# Patient Record
Sex: MALE | Race: WHITE | NOT HISPANIC OR LATINO | URBAN - METROPOLITAN AREA
[De-identification: names, ages, dates, MRNs, and addresses within clinical notes are randomized per-mention and may not be internally consistent; named-entity substitution may affect disease eponyms.]

---

## 2018-01-11 ENCOUNTER — INPATIENT (INPATIENT)
Facility: HOSPITAL | Age: 58
LOS: 4 days | Discharge: HOME | End: 2018-01-16
Attending: INTERNAL MEDICINE

## 2018-01-11 DIAGNOSIS — I24.9 ACUTE ISCHEMIC HEART DISEASE, UNSPECIFIED: ICD-10-CM

## 2018-01-19 DIAGNOSIS — I47.2 VENTRICULAR TACHYCARDIA: ICD-10-CM

## 2018-01-19 DIAGNOSIS — R07.9 CHEST PAIN, UNSPECIFIED: ICD-10-CM

## 2018-01-19 DIAGNOSIS — Z87.891 PERSONAL HISTORY OF NICOTINE DEPENDENCE: ICD-10-CM

## 2018-01-19 DIAGNOSIS — E11.8 TYPE 2 DIABETES MELLITUS WITH UNSPECIFIED COMPLICATIONS: ICD-10-CM

## 2018-01-19 DIAGNOSIS — I21.4 NON-ST ELEVATION (NSTEMI) MYOCARDIAL INFARCTION: ICD-10-CM

## 2018-01-19 DIAGNOSIS — I10 ESSENTIAL (PRIMARY) HYPERTENSION: ICD-10-CM

## 2018-01-19 DIAGNOSIS — E78.5 HYPERLIPIDEMIA, UNSPECIFIED: ICD-10-CM

## 2018-01-24 DIAGNOSIS — I25.10 ATHEROSCLEROTIC HEART DISEASE OF NATIVE CORONARY ARTERY WITHOUT ANGINA PECTORIS: ICD-10-CM

## 2018-01-24 DIAGNOSIS — F17.210 NICOTINE DEPENDENCE, CIGARETTES, UNCOMPLICATED: ICD-10-CM

## 2018-01-24 DIAGNOSIS — F41.9 ANXIETY DISORDER, UNSPECIFIED: ICD-10-CM

## 2018-04-27 PROBLEM — Z00.00 ENCOUNTER FOR PREVENTIVE HEALTH EXAMINATION: Status: ACTIVE | Noted: 2018-04-27

## 2018-05-10 ENCOUNTER — APPOINTMENT (OUTPATIENT)
Dept: VASCULAR SURGERY | Facility: CLINIC | Age: 58
End: 2018-05-10
Payer: COMMERCIAL

## 2018-05-10 VITALS
BODY MASS INDEX: 39.09 KG/M2 | WEIGHT: 270 LBS | DIASTOLIC BLOOD PRESSURE: 80 MMHG | SYSTOLIC BLOOD PRESSURE: 142 MMHG | HEIGHT: 69.5 IN

## 2018-05-10 PROCEDURE — 93925 LOWER EXTREMITY STUDY: CPT

## 2018-05-10 PROCEDURE — 99203 OFFICE O/P NEW LOW 30 MIN: CPT

## 2018-05-10 RX ORDER — HYDROCHLOROTHIAZIDE 25 MG/1
25 TABLET ORAL
Refills: 0 | Status: ACTIVE | COMMUNITY

## 2018-05-10 RX ORDER — ASPIRIN 81 MG
81 TABLET, DELAYED RELEASE (ENTERIC COATED) ORAL
Refills: 0 | Status: ACTIVE | COMMUNITY

## 2018-05-10 RX ORDER — ISOSORBIDE DINITRATE 30 MG/1
30 TABLET ORAL
Refills: 0 | Status: ACTIVE | COMMUNITY

## 2018-05-10 RX ORDER — PRAVASTATIN SODIUM 10 MG/1
10 TABLET ORAL
Refills: 0 | Status: ACTIVE | COMMUNITY

## 2018-05-10 RX ORDER — METOPROLOL SUCCINATE 25 MG/1
25 TABLET, EXTENDED RELEASE ORAL
Refills: 0 | Status: ACTIVE | COMMUNITY

## 2018-07-06 ENCOUNTER — OUTPATIENT (OUTPATIENT)
Dept: OUTPATIENT SERVICES | Facility: HOSPITAL | Age: 58
LOS: 1 days | Discharge: HOME | End: 2018-07-06

## 2018-07-06 DIAGNOSIS — Z95.5 PRESENCE OF CORONARY ANGIOPLASTY IMPLANT AND GRAFT: ICD-10-CM

## 2018-10-10 ENCOUNTER — INPATIENT (INPATIENT)
Facility: HOSPITAL | Age: 58
LOS: 2 days | Discharge: HOME | End: 2018-10-13
Attending: INTERNAL MEDICINE | Admitting: INTERNAL MEDICINE

## 2018-10-10 VITALS
WEIGHT: 265 LBS | OXYGEN SATURATION: 96 % | TEMPERATURE: 97 F | DIASTOLIC BLOOD PRESSURE: 70 MMHG | HEIGHT: 70 IN | HEART RATE: 74 BPM | RESPIRATION RATE: 18 BRPM | SYSTOLIC BLOOD PRESSURE: 117 MMHG

## 2018-10-10 NOTE — ED ADULT NURSE NOTE - NSIMPLEMENTINTERV_GEN_ALL_ED
Implemented All Universal Safety Interventions:  [place] to call system. Call bell, personal items and telephone within reach. Instruct patient to call for assistance. Room bathroom lighting operational. Non-slip footwear when patient is off stretcher. Physically safe environment: no spills, clutter or unnecessary equipment. Stretcher in lowest position, wheels locked, appropriate side rails in place.

## 2018-10-10 NOTE — ED PROVIDER NOTE - OBJECTIVE STATEMENT
57 yo hx of htn, dld, cad with stent jan '18, c/o lower cp, non rad, intermittent, started 2 days ago. no modifying factors. no sweats, n, v, ab pain. no leg pain or swelling. +sob "at times"

## 2018-10-11 DIAGNOSIS — I25.10 ATHEROSCLEROTIC HEART DISEASE OF NATIVE CORONARY ARTERY WITHOUT ANGINA PECTORIS: Chronic | ICD-10-CM

## 2018-10-11 DIAGNOSIS — Z98.890 OTHER SPECIFIED POSTPROCEDURAL STATES: Chronic | ICD-10-CM

## 2018-10-11 LAB
ALBUMIN SERPL ELPH-MCNC: 4 G/DL — SIGNIFICANT CHANGE UP (ref 3.5–5.2)
ALP SERPL-CCNC: 65 U/L — SIGNIFICANT CHANGE UP (ref 30–115)
ALT FLD-CCNC: 35 U/L — SIGNIFICANT CHANGE UP (ref 0–41)
ANION GAP SERPL CALC-SCNC: 14 MMOL/L — SIGNIFICANT CHANGE UP (ref 7–14)
APTT BLD: 33.7 SEC — SIGNIFICANT CHANGE UP (ref 27–39.2)
AST SERPL-CCNC: 33 U/L — SIGNIFICANT CHANGE UP (ref 0–41)
BASOPHILS # BLD AUTO: 0.02 K/UL — SIGNIFICANT CHANGE UP (ref 0–0.2)
BASOPHILS NFR BLD AUTO: 0.2 % — SIGNIFICANT CHANGE UP (ref 0–1)
BILIRUB DIRECT SERPL-MCNC: <0.2 MG/DL — SIGNIFICANT CHANGE UP (ref 0–0.2)
BILIRUB INDIRECT FLD-MCNC: >0 MG/DL — LOW (ref 0.2–1.2)
BILIRUB SERPL-MCNC: 0.2 MG/DL — SIGNIFICANT CHANGE UP (ref 0.2–1.2)
BILIRUB SERPL-MCNC: 0.2 MG/DL — SIGNIFICANT CHANGE UP (ref 0.2–1.2)
BUN SERPL-MCNC: 20 MG/DL — SIGNIFICANT CHANGE UP (ref 10–20)
CALCIUM SERPL-MCNC: 9.1 MG/DL — SIGNIFICANT CHANGE UP (ref 8.5–10.1)
CHLORIDE SERPL-SCNC: 102 MMOL/L — SIGNIFICANT CHANGE UP (ref 98–110)
CHOLEST SERPL-MCNC: 169 MG/DL — SIGNIFICANT CHANGE UP (ref 100–200)
CK MB CFR SERPL CALC: 2.6 NG/ML — SIGNIFICANT CHANGE UP (ref 0.6–6.3)
CK MB CFR SERPL CALC: 2.8 NG/ML — SIGNIFICANT CHANGE UP (ref 0.6–6.3)
CK SERPL-CCNC: 207 U/L — SIGNIFICANT CHANGE UP (ref 0–225)
CK SERPL-CCNC: 222 U/L — SIGNIFICANT CHANGE UP (ref 0–225)
CO2 SERPL-SCNC: 27 MMOL/L — SIGNIFICANT CHANGE UP (ref 17–32)
CREAT SERPL-MCNC: 1 MG/DL — SIGNIFICANT CHANGE UP (ref 0.7–1.5)
EOSINOPHIL # BLD AUTO: 0.16 K/UL — SIGNIFICANT CHANGE UP (ref 0–0.7)
EOSINOPHIL NFR BLD AUTO: 1.9 % — SIGNIFICANT CHANGE UP (ref 0–8)
ESTIMATED AVERAGE GLUCOSE: 126 MG/DL — HIGH (ref 68–114)
GLUCOSE SERPL-MCNC: 121 MG/DL — HIGH (ref 70–99)
HBA1C BLD-MCNC: 6 % — HIGH (ref 4–5.6)
HCT VFR BLD CALC: 37.2 % — LOW (ref 42–52)
HDLC SERPL-MCNC: 38 MG/DL — LOW
HGB BLD-MCNC: 12.6 G/DL — LOW (ref 14–18)
IMM GRANULOCYTES NFR BLD AUTO: 0.2 % — SIGNIFICANT CHANGE UP (ref 0.1–0.3)
INR BLD: 1.03 RATIO — SIGNIFICANT CHANGE UP (ref 0.65–1.3)
LIDOCAIN IGE QN: 117 U/L — HIGH (ref 7–60)
LIPID PNL WITH DIRECT LDL SERPL: 104 MG/DL — SIGNIFICANT CHANGE UP (ref 4–129)
LYMPHOCYTES # BLD AUTO: 3.09 K/UL — SIGNIFICANT CHANGE UP (ref 1.2–3.4)
LYMPHOCYTES # BLD AUTO: 37.1 % — SIGNIFICANT CHANGE UP (ref 20.5–51.1)
MCHC RBC-ENTMCNC: 30 PG — SIGNIFICANT CHANGE UP (ref 27–31)
MCHC RBC-ENTMCNC: 33.9 G/DL — SIGNIFICANT CHANGE UP (ref 32–37)
MCV RBC AUTO: 88.6 FL — SIGNIFICANT CHANGE UP (ref 80–94)
MONOCYTES # BLD AUTO: 0.55 K/UL — SIGNIFICANT CHANGE UP (ref 0.1–0.6)
MONOCYTES NFR BLD AUTO: 6.6 % — SIGNIFICANT CHANGE UP (ref 1.7–9.3)
NEUTROPHILS # BLD AUTO: 4.49 K/UL — SIGNIFICANT CHANGE UP (ref 1.4–6.5)
NEUTROPHILS NFR BLD AUTO: 54 % — SIGNIFICANT CHANGE UP (ref 42.2–75.2)
NRBC # BLD: 0 /100 WBCS — SIGNIFICANT CHANGE UP (ref 0–0)
PLATELET # BLD AUTO: 187 K/UL — SIGNIFICANT CHANGE UP (ref 130–400)
POTASSIUM SERPL-MCNC: 4.2 MMOL/L — SIGNIFICANT CHANGE UP (ref 3.5–5)
POTASSIUM SERPL-SCNC: 4.2 MMOL/L — SIGNIFICANT CHANGE UP (ref 3.5–5)
PROT SERPL-MCNC: 6.8 G/DL — SIGNIFICANT CHANGE UP (ref 6–8)
PROTHROM AB SERPL-ACNC: 11.1 SEC — SIGNIFICANT CHANGE UP (ref 9.95–12.87)
RBC # BLD: 4.2 M/UL — LOW (ref 4.7–6.1)
RBC # FLD: 12.3 % — SIGNIFICANT CHANGE UP (ref 11.5–14.5)
SODIUM SERPL-SCNC: 143 MMOL/L — SIGNIFICANT CHANGE UP (ref 135–146)
TOTAL CHOLESTEROL/HDL RATIO MEASUREMENT: 4.4 RATIO — SIGNIFICANT CHANGE UP (ref 4–5.5)
TRIGL SERPL-MCNC: 276 MG/DL — HIGH (ref 10–149)
TROPONIN T SERPL-MCNC: <0.01 NG/ML — SIGNIFICANT CHANGE UP
WBC # BLD: 8.33 K/UL — SIGNIFICANT CHANGE UP (ref 4.8–10.8)
WBC # FLD AUTO: 8.33 K/UL — SIGNIFICANT CHANGE UP (ref 4.8–10.8)

## 2018-10-11 RX ORDER — TICAGRELOR 90 MG/1
90 TABLET ORAL
Qty: 0 | Refills: 0 | Status: DISCONTINUED | OUTPATIENT
Start: 2018-10-11 | End: 2018-10-13

## 2018-10-11 RX ORDER — ENOXAPARIN SODIUM 100 MG/ML
40 INJECTION SUBCUTANEOUS DAILY
Qty: 0 | Refills: 0 | Status: DISCONTINUED | OUTPATIENT
Start: 2018-10-11 | End: 2018-10-13

## 2018-10-11 RX ORDER — ATORVASTATIN CALCIUM 80 MG/1
10 TABLET, FILM COATED ORAL AT BEDTIME
Qty: 0 | Refills: 0 | Status: DISCONTINUED | OUTPATIENT
Start: 2018-10-11 | End: 2018-10-13

## 2018-10-11 RX ORDER — MORPHINE SULFATE 50 MG/1
4 CAPSULE, EXTENDED RELEASE ORAL EVERY 4 HOURS
Qty: 0 | Refills: 0 | Status: DISCONTINUED | OUTPATIENT
Start: 2018-10-11 | End: 2018-10-13

## 2018-10-11 RX ORDER — LISINOPRIL 2.5 MG/1
40 TABLET ORAL DAILY
Qty: 0 | Refills: 0 | Status: DISCONTINUED | OUTPATIENT
Start: 2018-10-11 | End: 2018-10-13

## 2018-10-11 RX ORDER — ASPIRIN/CALCIUM CARB/MAGNESIUM 324 MG
81 TABLET ORAL DAILY
Qty: 0 | Refills: 0 | Status: DISCONTINUED | OUTPATIENT
Start: 2018-10-11 | End: 2018-10-13

## 2018-10-11 RX ORDER — TICAGRELOR 90 MG/1
90 TABLET ORAL ONCE
Qty: 0 | Refills: 0 | Status: COMPLETED | OUTPATIENT
Start: 2018-10-11 | End: 2018-10-11

## 2018-10-11 RX ORDER — DOXAZOSIN MESYLATE 4 MG
4 TABLET ORAL AT BEDTIME
Qty: 0 | Refills: 0 | Status: DISCONTINUED | OUTPATIENT
Start: 2018-10-11 | End: 2018-10-13

## 2018-10-11 RX ORDER — ISOSORBIDE MONONITRATE 60 MG/1
30 TABLET, EXTENDED RELEASE ORAL DAILY
Qty: 0 | Refills: 0 | Status: DISCONTINUED | OUTPATIENT
Start: 2018-10-11 | End: 2018-10-13

## 2018-10-11 RX ORDER — MECLIZINE HCL 12.5 MG
25 TABLET ORAL THREE TIMES A DAY
Qty: 0 | Refills: 0 | Status: DISCONTINUED | OUTPATIENT
Start: 2018-10-11 | End: 2018-10-13

## 2018-10-11 RX ORDER — HYDROCHLOROTHIAZIDE 25 MG
25 TABLET ORAL DAILY
Qty: 0 | Refills: 0 | Status: DISCONTINUED | OUTPATIENT
Start: 2018-10-11 | End: 2018-10-13

## 2018-10-11 RX ORDER — METOPROLOL TARTRATE 50 MG
25 TABLET ORAL DAILY
Qty: 0 | Refills: 0 | Status: DISCONTINUED | OUTPATIENT
Start: 2018-10-11 | End: 2018-10-13

## 2018-10-11 RX ADMIN — MORPHINE SULFATE 4 MILLIGRAM(S): 50 CAPSULE, EXTENDED RELEASE ORAL at 11:49

## 2018-10-11 RX ADMIN — Medication 25 MILLIGRAM(S): at 22:46

## 2018-10-11 RX ADMIN — ISOSORBIDE MONONITRATE 30 MILLIGRAM(S): 60 TABLET, EXTENDED RELEASE ORAL at 11:47

## 2018-10-11 RX ADMIN — TICAGRELOR 90 MILLIGRAM(S): 90 TABLET ORAL at 22:46

## 2018-10-11 RX ADMIN — MORPHINE SULFATE 4 MILLIGRAM(S): 50 CAPSULE, EXTENDED RELEASE ORAL at 12:12

## 2018-10-11 RX ADMIN — Medication 25 MILLIGRAM(S): at 12:45

## 2018-10-11 RX ADMIN — Medication 81 MILLIGRAM(S): at 11:47

## 2018-10-11 RX ADMIN — ATORVASTATIN CALCIUM 10 MILLIGRAM(S): 80 TABLET, FILM COATED ORAL at 22:45

## 2018-10-11 RX ADMIN — MORPHINE SULFATE 4 MILLIGRAM(S): 50 CAPSULE, EXTENDED RELEASE ORAL at 22:51

## 2018-10-11 RX ADMIN — MORPHINE SULFATE 4 MILLIGRAM(S): 50 CAPSULE, EXTENDED RELEASE ORAL at 16:57

## 2018-10-11 RX ADMIN — Medication 4 MILLIGRAM(S): at 22:46

## 2018-10-11 RX ADMIN — LISINOPRIL 40 MILLIGRAM(S): 2.5 TABLET ORAL at 11:47

## 2018-10-11 RX ADMIN — TICAGRELOR 90 MILLIGRAM(S): 90 TABLET ORAL at 12:34

## 2018-10-11 RX ADMIN — ENOXAPARIN SODIUM 40 MILLIGRAM(S): 100 INJECTION SUBCUTANEOUS at 11:47

## 2018-10-11 RX ADMIN — Medication 25 MILLIGRAM(S): at 11:47

## 2018-10-11 NOTE — PROGRESS NOTE ADULT - SUBJECTIVE AND OBJECTIVE BOX
MIKAL OLIVAS 59yo WMale came to the ER c/o L sided CP x few weeks but inc in frequency and intensity last few days.  Th CP is L sided, sharp, assoc with lightheadedness and SOB. The pt deniesN/V/diaphoresis and LOC.  The PMHx includes HTN, ASHD, CAD, sp PCI, Hyperlipidemia, BPH.    INTERVAL HPI/OVERNIGHT EVENTS:  CE are negative, EKG is normal pt was evaluated by Cardio, no stress test planne, to look for other etiology of the CP ? musculoskeletal, GI ie GB, cervicalgia    MEDICATIONS  (STANDING):  aspirin  chewable 81 milliGRAM(s) Oral daily  atorvastatin 10 milliGRAM(s) Oral at bedtime  doxazosin 4 milliGRAM(s) Oral at bedtime  enoxaparin Injectable 40 milliGRAM(s) SubCutaneous daily  hydrochlorothiazide 25 milliGRAM(s) Oral daily  isosorbide   mononitrate ER Tablet (IMDUR) 30 milliGRAM(s) Oral daily  lisinopril 40 milliGRAM(s) Oral daily  meclizine 25 milliGRAM(s) Oral three times a day  metoprolol succinate ER 25 milliGRAM(s) Oral daily  ticagrelor 90 milliGRAM(s) Oral two times a day    MEDICATIONS  (PRN):  morphine  - Injectable 4 milliGRAM(s) IV Push every 4 hours PRN Moderate Pain (4 - 6)      Allergies  NKDA    Vital Signs Last 24 Hrs  T(C): 35.8 (11 Oct 2018 16:12), Max: 36.4 (11 Oct 2018 00:19)  T(F): 96.4 (11 Oct 2018 16:12), Max: 97.6 (11 Oct 2018 00:19)  HR: 52 (11 Oct 2018 16:12) (52 - 74)  BP: 116/73 (11 Oct 2018 16:12) (116/73 - 142/77)  BP(mean): --  RR: 18 (11 Oct 2018 16:12) (18 - 18)  SpO2: 96% (11 Oct 2018 16:12) (96% - 97%)    PHYSICAL EXAM:      Constitutional:  alert, oriented, in NAD    Eyes:  normal    ENMT:  dry oral mucosa    Neck:  supple, no JVD no bruits    Respiratory:  shallow respirations but no rales, crackles or wheezing    Cardiovascular:  S1S2 reg    Gastrointestinal:  globose soft and benign    Extremities:  moves all ext, no edema      LABS:                        12.6   8.33  )-----------( 187      ( 10 Oct 2018 22:39 )             37.2     10-10    143  |  102  |  20  ----------------------------<  121<H>  4.2   |  27  |  1.0    TC  169, TRI  276, HDL 38,      ,207  MB 2.6, 2.8  trop 0.01 x 3    Ca    9.1      10 Oct 2018 22:39    TPro  6.8  /  Alb  4.0  /  TBili  0.2  /  DBili  <0.2  /  AST  33  /  ALT  35  /  AlkPhos  65  10-10    PT/INR - ( 10 Oct 2018 22:39 )   PT: 11.10 sec;   INR: 1.03 ratio         PTT - ( 10 Oct 2018 22:39 )  PTT:33.7 sec      RADIOLOGY & ADDITIONAL TESTS:  CXR no infiltrates  sono of abd no acute pathology, + hepatic steatosis  EKG  NSR  75/min no acute changes

## 2018-10-11 NOTE — H&P ADULT - NSHPOUTPATIENTPROVIDERS_GEN_ALL_CORE
Cardio: Dr Mcrae Cardio: Dr Mcrae placed stent at [facility], otherwise pt seen by Dr Vee

## 2018-10-11 NOTE — PROGRESS NOTE ADULT - ASSESSMENT
chest pain of unclear etiology  Hx of HTN, ASHD, CAD, sp PCI  Hx of Hyperlipidemia, hepatic steatosis  Hx of BPH    cardiac enzymes, EKG, ECHO  Cardiology consult  continue all meds  sono of abdomen shows no sig pathology, bu organs obscured by gas, may need CT of abdomen  att to possible DDD of C spine and thoracic spine, get Xrays of C  and Th spine and CT of the C spine

## 2018-10-11 NOTE — CONSULT NOTE ADULT - ASSESSMENT
1] Atypical Chest Pain - doubt cardiac.  Cervical Radiculopathy?      Known CAD S/P MI S/P PTCA/Stent  - Trend cardiac enzymes  - No planned stress test   - Patient is physically active and exercises 3-4 times a week at home using treadmill.  He denies any exertional chest pain.  His chest pain now is described as sharp, intermittent, localized in left chest.    2] Lightheadedness  - Patient complains of lightheadedness with his episodes of sharp chest pain  - If persistent, consider Meclizine.  Consider Neuro eval if lightheadedness persists    3] Hypertension  - continue present medications    4] Hyperlipidemia   - on statin therapy    5] DJD of Cervical Neck  - Patient has history of bulging discs in cervical neck spine (Had MRI as outpatient)    Plan discussed with House Staff  DVT prophylaxis    Blaze Vee MD   [phone] Office

## 2018-10-11 NOTE — H&P ADULT - NSHPLABSRESULTS_GEN_ALL_CORE
CARDIAC MARKERS ( 10 Oct 2018 22:39 )  x     / <0.01 ng/mL / x     / x     / x                      12.6   8.33  )-----------( 187      ( 10 Oct 2018 22:39 )             37.2     10-10  143  |  102  |  20  ----------------------------<  121<H>  4.2   |  27  |  1.0  Ca    9.1      10 Oct 2018 22:39  TPro 6.8  /  Alb  4.0  /  TBili  0.2  /  DBili  <0.2  /  AST  33  /  ALT  35  /  AlkPhos  65  10-10  LIVER FUNCTIONS - ( 10 Oct 2018 22:39 )  Alb: 4.0 g/dL / Pro: 6.8 g/dL / ALK PHOS: 65 U/L / ALT: 35 U/L / AST: 33 U/L / GGT: x         PT/INR - ( 10 Oct 2018 22:39 )   PT: 11.10 sec;   INR: 1.03 ratio    PTT - ( 10 Oct 2018 22:39 )  PTT:33.7 sec

## 2018-10-11 NOTE — H&P ADULT - NSHPPHYSICALEXAM_GEN_ALL_CORE
General: morbidly obese gentleman reclining flat in bed  Cardiac: RRR, S1S2, L sided chest pain is not reproducible on palpation, no epigastric or RUQ pain on deep palpation  Lungs: CTAB, but distant breath sounds due to body habitus  Abd: NTND, +BS  LE: very minimal edema bilaterally  Neuro: AAOX3, nonfocal

## 2018-10-11 NOTE — H&P ADULT - HISTORY OF PRESENT ILLNESS
59 y/o M with CAD s/p PCI x1 ([place], with Dr Mcrae, 1/2018), HTN, DLD, BPH, presenting for L sided chest pain. For the last couple of months pt has noticed a slight decrease in his exercise limitation. He now notices that he has difficulty tying his shoelaces or takes a longer time to walk up the stairs. For the past couple of weeks, he has noticed L sided sharp chest pain, almost electrical in nature, it comes and goes, lasting for a couple seconds, happening once every couple of days, not precipitated by any activity. Has not tried taking anything to make it better. However, for the past 2 days, he has noticed that the chest pain comes every 15 minutes and is associated with a "light lightheadedness". This prompted him to come to ER. He has not actually lost any consciousness or felt it extreme to the point where he thinks he will pass out. The chest pain is not associated with any nausea, vomiting, radiation to the neck/jaw/arm. The pain does not change with position. Denies any trauma to the area. He does however state that the pain sometimes comes on 45min after he takes his Brilinta.  Pt endorses 2 week hx of neck pain along the C-spine, he states it feels different than his usual pain from herniated discs. He also notices bilateral lower extremity soreness and pain when he starts walking in the morning. He denies any hx of DMII and does not describe it as neuropathic in nature. Otherwise he has been in good health. 57 y/o M with CAD s/p PCI x1 ([place], with Dr Mcrae, 1/2018), HTN, DLD, BPH, presenting for L sided chest pain. For the last couple of months he notices that he has difficulty tying his shoelaces or takes a longer time to walk up the stairs. At the same time, he endorses being able to walk 30min on the treadmill 3-4x/week with no difficulty. For the past couple of weeks, he has noticed L sided sharp chest pain, almost electrical in nature, it comes and goes, lasting for a couple seconds, happening once every couple of days, not precipitated by any activity. Has not tried taking anything to make it better. However, for the past 2 days, he has noticed that the chest pain comes every 15 minutes and is associated with a "light lightheadedness". This prompted him to come to ER. He has not actually lost any consciousness or felt it extreme to the point where he thinks he will pass out. The chest pain is not associated with any nausea, vomiting, radiation to the neck/jaw/arm. The pain does not change with position. Denies any trauma to the area. He does however state that the pain sometimes comes on 45min after he takes his Brilinta. Pt last saw his cardiologist Dr Vee in May, when he was told everything is ok.  Pt endorses 2 week hx of neck pain along the C-spine, he states it feels different than his usual pain from herniated discs. He also notices bilateral lower extremity soreness and pain when he starts walking in the morning. He denies any hx of DMII and does not describe it as neuropathic in nature. Otherwise he has been in good health.

## 2018-10-11 NOTE — CONSULT NOTE ADULT - SUBJECTIVE AND OBJECTIVE BOX
Patient is a 58y old  Male who presents with a chief complaint of L sided CP (11 Oct 2018 09:01)      REASON FOR CONSULT     HPI:  59 y/o M with CAD s/p PCI x1 ([place], with Dr Mcrae, 1/2018), HTN, DLD, BPH, presenting for L sided chest pain. For the last couple of months he notices that he has difficulty tying his shoelaces or takes a longer time to walk up the stairs. At the same time, he endorses being able to walk 30min on the treadmill 3-4x/week with no difficulty. For the past couple of weeks, he has noticed L sided sharp chest pain, almost electrical in nature, it comes and goes, lasting for a couple seconds, happening once every couple of days, not precipitated by any activity. Has not tried taking anything to make it better. However, for the past 2 days, he has noticed that the chest pain comes every 15 minutes and is associated with a "light lightheadedness". This prompted him to come to ER. He has not actually lost any consciousness or felt it extreme to the point where he thinks he will pass out. The chest pain is not associated with any nausea, vomiting, radiation to the neck/jaw/arm. The pain does not change with position. Denies any trauma to the area. He does however state that the pain sometimes comes on 45min after he takes his Brilinta. Pt last saw his cardiologist Dr Vee in May, when he was told everything is ok.  Pt endorses 2 week hx of neck pain along the C-spine, he states it feels different than his usual pain from herniated discs. He also notices bilateral lower extremity soreness and pain when he starts walking in the morning. He denies any hx of DMII and does not describe it as neuropathic in nature. Otherwise he has been in good health. (11 Oct 2018 09:01)      PAST MEDICAL & SURGICAL HISTORY:  Dyslipidemia  HTN (hypertension)  CAD (coronary artery disease)  Coronary artery disease: s/p PCI x1  History of umbilical hernia repair          SOCIAL HISTORY:     FAMILY HISTORY:  Family history of coronary artery disease (Father, Sibling)    No Known Allergies      MEDICATIONS  (STANDING):  aspirin  chewable 81 milliGRAM(s) Oral daily  atorvastatin 10 milliGRAM(s) Oral at bedtime  doxazosin 4 milliGRAM(s) Oral at bedtime  enoxaparin Injectable 40 milliGRAM(s) SubCutaneous daily  hydrochlorothiazide 25 milliGRAM(s) Oral daily  isosorbide   mononitrate ER Tablet (IMDUR) 30 milliGRAM(s) Oral daily  lisinopril 40 milliGRAM(s) Oral daily  metoprolol succinate ER 25 milliGRAM(s) Oral daily  ticagrelor 90 milliGRAM(s) Oral two times a day    MEDICATIONS  (PRN):  morphine  - Injectable 4 milliGRAM(s) IV Push every 4 hours PRN Moderate Pain (4 - 6)      Vital Signs Last 24 Hrs  T(C): 36.1 (11 Oct 2018 08:24), Max: 36.4 (11 Oct 2018 00:19)  T(F): 97 (11 Oct 2018 08:24), Max: 97.6 (11 Oct 2018 00:19)  HR: 60 (11 Oct 2018 08:24) (60 - 74)  BP: 133/81 (11 Oct 2018 08:24) (117/70 - 142/77)  BP(mean): --  RR: 18 (11 Oct 2018 08:24) (18 - 18)  SpO2: 97% (11 Oct 2018 08:24) (96% - 97%) I&O's Detail    PHYSICAL EXAM:      Constitutional: appears stated age, well developed/nourished, no acute distress    Eyes: EOM's intact.  PERRLA    ENMT: Normocepahic, atraumatic.  Clear oropharynx.  No ear discharge.    Neck: Jugular veins non-distended; no carotid bruits bilaterally.    Breasts: No gross abnormalities noted.    Respiratory: respiratory pattern unlabored; no dullness to percussion; lungs clear to asuculatation bilaterally.    Cardiovascular: Regular rhythm.  S1 and S2 normal.  No murmur nor rub appreciated.    Abdomen: Soft, non-tender.  Normal bowel sounds.    Extremities: extremities warm; no cyanosis, clubbing or edema.    Pulses: Intact bilaterally    Skin: No gross abnormalities noted.    Musculoskeletal: No gross deformities    Neurological: Alert, oriented x 3.  No focal neurologic deficits noted.  REVIEW OF SYSTEMS      CONSTITUTIONAL:  No night sweats.  No fatigue, malaise, lethargy.  No fever or chills.    HEENT:  Eyes:  No visual changes.  No eye pain.  No eye discharge.  ENT:  No runny nose.  No epistaxis.  No sinus pain.  No sore throat.  No odynophagia.  No ear pain.  No congestion.    BREASTS:  No breast pain, soreness, lumps, or discharge.    RESPIRATORY:  No cough.  No wheeze.  No hemoptysis.  No shortness of breath.    CARDIOVASCULAR:  No chest pains.  No palpitations.     GASTROINTESTINAL:  No abdominal pain.  No nausea or vomiting.  No diarrhea or constipation.  No hematemesis.  No hematochezia.  No melena.    GENITOURINARY:  No urgency.  No frequency.  No dysuria.  No hematuria.  No obstructive symptoms.  No discharge.  No pain.  No significant abnormal bleeding.    MUSCULOSKELETAL:  No musculoskeletal pain.  No joint swelling.  No arthritis.    NEUROLOGICAL:    No headache or neck pain.  No syncope or seizure. no weakness . No Vertigo.    SKIN:  No rashes.  No lesions.  No wounds.    ENDOCRINE:  No unexplained weight loss.  No polydipsia.  No polyuria.  No polyphagia.    HEMATOLOGIC:  No anemia.  No purpura.  No petechiae.  No prolonged or excessive bleeding.     ALLERGIC AND IMMUNOLOGIC:  No pruritus.  No swelling.       ECG:  ECHOCARDIOGRAM:  RADIOLOGY & ADDITIONAL STUDIES:      LABS:                        12.6   8.33  )-----------( 187      ( 10 Oct 2018 22:39 )             37.2     10-10    143  |  102  |  20  ----------------------------<  121<H>  4.2   |  27  |  1.0    Ca    9.1      10 Oct 2018 22:39    TPro  6.8  /  Alb  4.0  /  TBili  0.2  /  DBili  <0.2  /  AST  33  /  ALT  35  /  AlkPhos  65  10-10    CARDIAC MARKERS ( 11 Oct 2018 08:27 )  x     / <0.01 ng/mL / 222 U/L / x     / 2.6 ng/mL  CARDIAC MARKERS ( 10 Oct 2018 22:39 )  x     / <0.01 ng/mL / x     / x     / x          PT/INR - ( 10 Oct 2018 22:39 )   PT: 11.10 sec;   INR: 1.03 ratio         PTT - ( 10 Oct 2018 22:39 )  PTT:33.7 sec  I&O's Summary Patient was seen and examined by me in Main ER.  Patient is known to me.      Patient is a 58y old  Male who presents with a chief complaint of L sided CP (11 Oct 2018 09:01)      REASON FOR CONSULT     HPI:    Mr. Ham Moses is an 58-year-old  male with past medical history of CAD, S/P MI, S/P PTCA/Stent (01/2018: [place]), Hypertension, Hyperlipidemia, SOBEIDA, DJD Cervical Neck Spine, BPH and Obesity.    He presented at [facility] because of recurrent episodes of sharp left sided chest pain associated with lightheadedness for the past 2 days.  He denies any exertional chest pain and shortness of breath.  He exercises on his treadmill for about 30 mintues 3-4 times a week without any chest pain.  He also complains of having shortness of breath when he bends down to tie his shoe laces.    In the ER, he appears comfortable in his gurney, supine with one pillow.   He states that he continues to have the left sided sharp chest pain.      House Staff Admission Notes:  57 y/o M with CAD s/p PCI x1 ([place], with Dr Mcrae, 1/2018), HTN, DLD, BPH, presenting for L sided chest pain. For the last couple of months he notices that he has difficulty tying his shoelaces or takes a longer time to walk up the stairs. At the same time, he endorses being able to walk 30min on the treadmill 3-4x/week with no difficulty. For the past couple of weeks, he has noticed L sided sharp chest pain, almost electrical in nature, it comes and goes, lasting for a couple seconds, happening once every couple of days, not precipitated by any activity. Has not tried taking anything to make it better. However, for the past 2 days, he has noticed that the chest pain comes every 15 minutes and is associated with a "light lightheadedness". This prompted him to come to ER. He has not actually lost any consciousness or felt it extreme to the point where he thinks he will pass out. The chest pain is not associated with any nausea, vomiting, radiation to the neck/jaw/arm. The pain does not change with position. Denies any trauma to the area. He does however state that the pain sometimes comes on 45min after he takes his Brilinta. Pt last saw his cardiologist Dr Vee in May, when he was told everything is ok.  Pt endorses 2 week hx of neck pain along the C-spine, he states it feels different than his usual pain from herniated discs. He also notices bilateral lower extremity soreness and pain when he starts walking in the morning. He denies any hx of DMII and does not describe it as neuropathic in nature. Otherwise he has been in good health. (11 Oct 2018 09:01)      PAST MEDICAL & SURGICAL HISTORY:  Dyslipidemia  HTN (hypertension)  CAD (coronary artery disease)  Coronary artery disease: s/p PCI x1  History of umbilical hernia repair          SOCIAL HISTORY: 40 pack- years; quit in January 2018    FAMILY HISTORY:  Family history of coronary artery disease (Father, Sibling)    No Known Allergies      MEDICATIONS  (STANDING):  aspirin  chewable 81 milliGRAM(s) Oral daily  atorvastatin 10 milliGRAM(s) Oral at bedtime  doxazosin 4 milliGRAM(s) Oral at bedtime  enoxaparin Injectable 40 milliGRAM(s) SubCutaneous daily  hydrochlorothiazide 25 milliGRAM(s) Oral daily  isosorbide   mononitrate ER Tablet (IMDUR) 30 milliGRAM(s) Oral daily  lisinopril 40 milliGRAM(s) Oral daily  metoprolol succinate ER 25 milliGRAM(s) Oral daily  ticagrelor 90 milliGRAM(s) Oral two times a day    MEDICATIONS  (PRN):  morphine  - Injectable 4 milliGRAM(s) IV Push every 4 hours PRN Moderate Pain (4 - 6)      Vital Signs Last 24 Hrs  T(C): 36.1 (11 Oct 2018 08:24), Max: 36.4 (11 Oct 2018 00:19)  T(F): 97 (11 Oct 2018 08:24), Max: 97.6 (11 Oct 2018 00:19)  HR: 60 (11 Oct 2018 08:24) (60 - 74)  BP: 133/81 (11 Oct 2018 08:24) (117/70 - 142/77)  BP(mean): --  RR: 18 (11 Oct 2018 08:24) (18 - 18)  SpO2: 97% (11 Oct 2018 08:24) (96% - 97%) I&O's Detail    PHYSICAL EXAM:  Constitutional: appears stated age, well developed/obese, no acute distress  Eyes: EOM's intact.  PERRLA  ENMT: Normocephalic, atraumatic.    Neck: Jugular veins non-distended; no carotid bruits bilaterally.  Respiratory: respiratory pattern unlabored; no dullness to percussion; lungs clear to  auscultation bilaterally.  Cardiovascular: Regular rhythm.  S1 and S2 normal.  No murmur nor rub appreciated.  Abdomen: Soft, non-tender.  Normal bowel sounds.  Extremities: extremities warm; no cyanosis, clubbing or edema.  Pulses: Intact bilaterally  Skin: No gross abnormalities noted.  Musculoskeletal: No gross deformities  Neurological: Alert, oriented x 3.  No focal neurologic deficits noted.      REVIEW OF SYSTEMS  CONSTITUTIONAL:  No night sweats.  No fatigue, malaise, lethargy.  No fever or chills.  HEENT:  Eyes:  No visual changes.  No eye pain.  No eye discharge.  ENT:  No runny nose.  No epistaxis.  No sinus pain.  No sore throat.  No odynophagia.  No ear pain.  No congestion  RESPIRATORY:  No cough.  No wheeze.  No hemoptysis.  Shortness of breath when bending down to tie shoe laces.  CARDIOVASCULAR:  No exertional chest pains.  No palpitations.   GASTROINTESTINAL:  No diarrhea or constipation.  No hematemesis.  No hematochezia.  No melena.  GENITOURINARY:  No urgency.  No frequency.  No dysuria.  No hematuria.  MUSCULOSKELETAL:  No musculoskeletal pain.  No joint swelling.  No arthritis.  NEUROLOGICAL:    No syncope or seizure. no weakness .   SKIN:  No rashes.    ENDOCRINE:    No polydipsia.  No polyuria.  No polyphagia.  HEMATOLOGIC: No purpura.  No petechiae.    ALLERGIC AND IMMUNOLOGIC:  No pruritus.  No swelling.     ECG:  Sinus Rhythm    RADIOLOGY & ADDITIONAL STUDIES:    CXR  < from: Xray Chest 1 View- PORTABLE-Urgent (10.11.18 @ 00:07) >  PROCEDURE DATE:  10/11/2018            INTERPRETATION:  Clinical History / Reason for exam: Chest pain.    Comparison : Chest radiograph January 12, 2018.    Technique/Positioning: Satisfactory.    Findings:    Support devices: None.    Cardiac/mediastinum/hilum: Unremarkable.    Lung parenchyma/Pleura: Within normal limits.    Skeleton/soft tissues: Unremarkable.    Impression:      No radiographic evidence of acute pulmonary disease.        PAUL PETER M.D., ATTENDING RADIOLOGIST  This document has been electronically signed. Oct 11 2018  8:58AM        < end of copied text >          LABS:                        12.6   8.33  )-----------( 187      ( 10 Oct 2018 22:39 )             37.2     10-10    143  |  102  |  20  ----------------------------<  121<H>  4.2   |  27  |  1.0    Ca    9.1      10 Oct 2018 22:39    TPro  6.8  /  Alb  4.0  /  TBili  0.2  /  DBili  <0.2  /  AST  33  /  ALT  35  /  AlkPhos  65  10-10    CARDIAC MARKERS ( 11 Oct 2018 08:27 )  x     / <0.01 ng/mL / 222 U/L / x     / 2.6 ng/mL  CARDIAC MARKERS ( 10 Oct 2018 22:39 )  x     / <0.01 ng/mL / x     / x     / x          PT/INR - ( 10 Oct 2018 22:39 )   PT: 11.10 sec;   INR: 1.03 ratio         PTT - ( 10 Oct 2018 22:39 )  PTT:33.7 sec

## 2018-10-11 NOTE — H&P ADULT - ASSESSMENT
1. L sided episodic chest pain, hx of CAD s/p PCI x1, r/o Ischemic Heart Disease  -trend CE, first set neg, EKG: NSR, check echo, admit to tele  -Cardio c/s: Dr Mcrae, stress test?  -c/w asa, brilinta, statin, isosorbide mononitrate ER, metoprolol succ ER  -check lipid panel and A1c  -despite elevated lipase, unlikely to be pancreatitis as US shows no pancreatic or peripancreatic changes and there is no epigastric pain on palpation  -of note however is that the US could not visualize the gallbladder due to overlying gas pattern    2. HTN: c/w hctz, ace-i, metoprolol succ ER  3. BPH: c/w doxazosin  4. DVT PPx: lovenox sq 1. ATYPICAL L sided episodic chest pain, hx of CAD s/p PCI x1, r/o Ischemic Heart Disease  -trend CE, first set neg, EKG: NSR, check echo, admit to tele  -Cardio c/s: Dr Mcrae, stress test?  -c/w asa, brilinta, statin, isosorbide mononitrate ER, metoprolol succ ER  -check lipid panel and A1c  -despite elevated lipase, unlikely to be pancreatitis as US shows no pancreatic or peripancreatic changes and there is no epigastric pain on palpation  -of note however is that the US could not visualize the gallbladder due to overlying gas pattern    2. HTN: c/w hctz, ace-i, metoprolol succ ER  3. BPH: c/w doxazosin  4. DVT PPx: lovenox sq 1. ATYPICAL L sided episodic chest pain, hx of CAD s/p PCI x1, r/o Ischemic Heart Disease  -trend CE, first set neg, EKG: NSR, check echo, admit to tele  -Cardio c/s, Dr Vee saw pt at bedside and due to the atypical nature of the pain, recommended checking second set of CE and if it is neg then no further cardiac w/up at this time  -c/w asa, brilinta, statin, isosorbide mononitrate ER, metoprolol succ ER  -check lipid panel and A1c  -despite elevated lipase, unlikely to be pancreatitis as US shows no pancreatic or peripancreatic changes and there is no epigastric pain on palpation  -of note however is that the US could not visualize the gallbladder due to overlying gas pattern    2. HTN: c/w hctz, ace-i, metoprolol succ ER  3. BPH: c/w doxazosin  4. DVT PPx: lovenox sq 1. ATYPICAL L sided episodic chest pain, hx of CAD s/p PCI x1, r/o Ischemic Heart Disease  -trend CE, first set neg, EKG: NSR, check echo, admit to tele  -Cardio c/s, Dr Vee saw pt at bedside and due to the atypical nature of the pain, recommended checking second set of CE and if it is neg then no further cardiac w/up at this time  -c/w asa, brilinta, statin, isosorbide mononitrate ER, metoprolol succ ER  -check lipid panel and A1c  -despite elevated lipase, unlikely to be pancreatitis as US shows no pancreatic or peripancreatic changes and there is no epigastric pain on palpation  -of note however is that the US could not visualize the gallbladder due to overlying gas pattern  -trial of meclizine for dizziness  2. HTN: c/w hctz, ace-i, metoprolol succ ER  3. BPH: c/w doxazosin  4. DVT PPx: lovenox sq 1. ATYPICAL L sided episodic chest pain, hx of CAD s/p PCI x1, r/o Ischemic Heart Disease  -trend CE, first set neg, EKG: NSR, check echo, admit to tele  -Cardio c/s, Dr Vee saw pt at bedside and due to the atypical nature of the pain, recommended checking second set of CE and if it is neg then no further cardiac w/up at this time  -c/w asa, brilinta, statin, isosorbide mononitrate ER, metoprolol succ ER  -check lipid panel and A1c  -despite elevated lipase, unlikely to be pancreatitis as US shows no pancreatic or peripancreatic changes and there is no epigastric pain on palpation  -of note however is that the US could not visualize the gallbladder due to overlying gas pattern  -trial of meclizine for dizziness  2. HTN: c/w hctz, ace-i, metoprolol succ ER  3. BPH: c/w doxazosin  4. DVT PPx: lovenox sq    Dr Guadalupe's service informed that pt is admitted under her at the following #[phone]

## 2018-10-12 LAB
ALBUMIN SERPL ELPH-MCNC: 3.7 G/DL — SIGNIFICANT CHANGE UP (ref 3.5–5.2)
ALP SERPL-CCNC: 63 U/L — SIGNIFICANT CHANGE UP (ref 30–115)
ALT FLD-CCNC: 30 U/L — SIGNIFICANT CHANGE UP (ref 0–41)
ANION GAP SERPL CALC-SCNC: 10 MMOL/L — SIGNIFICANT CHANGE UP (ref 7–14)
AST SERPL-CCNC: 23 U/L — SIGNIFICANT CHANGE UP (ref 0–41)
BILIRUB SERPL-MCNC: 0.2 MG/DL — SIGNIFICANT CHANGE UP (ref 0.2–1.2)
BUN SERPL-MCNC: 21 MG/DL — HIGH (ref 10–20)
CALCIUM SERPL-MCNC: 9.2 MG/DL — SIGNIFICANT CHANGE UP (ref 8.5–10.1)
CHLORIDE SERPL-SCNC: 99 MMOL/L — SIGNIFICANT CHANGE UP (ref 98–110)
CO2 SERPL-SCNC: 32 MMOL/L — SIGNIFICANT CHANGE UP (ref 17–32)
CREAT SERPL-MCNC: 1 MG/DL — SIGNIFICANT CHANGE UP (ref 0.7–1.5)
GLUCOSE SERPL-MCNC: 105 MG/DL — HIGH (ref 70–99)
HCT VFR BLD CALC: 38 % — LOW (ref 42–52)
HGB BLD-MCNC: 12.7 G/DL — LOW (ref 14–18)
MAGNESIUM SERPL-MCNC: 1.8 MG/DL — SIGNIFICANT CHANGE UP (ref 1.8–2.4)
MCHC RBC-ENTMCNC: 29.7 PG — SIGNIFICANT CHANGE UP (ref 27–31)
MCHC RBC-ENTMCNC: 33.4 G/DL — SIGNIFICANT CHANGE UP (ref 32–37)
MCV RBC AUTO: 88.8 FL — SIGNIFICANT CHANGE UP (ref 80–94)
NRBC # BLD: 0 /100 WBCS — SIGNIFICANT CHANGE UP (ref 0–0)
PHOSPHATE SERPL-MCNC: 4.1 MG/DL — SIGNIFICANT CHANGE UP (ref 2.1–4.9)
PLATELET # BLD AUTO: 178 K/UL — SIGNIFICANT CHANGE UP (ref 130–400)
POTASSIUM SERPL-MCNC: 4.1 MMOL/L — SIGNIFICANT CHANGE UP (ref 3.5–5)
POTASSIUM SERPL-SCNC: 4.1 MMOL/L — SIGNIFICANT CHANGE UP (ref 3.5–5)
PROT SERPL-MCNC: 6.3 G/DL — SIGNIFICANT CHANGE UP (ref 6–8)
RBC # BLD: 4.28 M/UL — LOW (ref 4.7–6.1)
RBC # FLD: 12.1 % — SIGNIFICANT CHANGE UP (ref 11.5–14.5)
SODIUM SERPL-SCNC: 141 MMOL/L — SIGNIFICANT CHANGE UP (ref 135–146)
WBC # BLD: 8.41 K/UL — SIGNIFICANT CHANGE UP (ref 4.8–10.8)
WBC # FLD AUTO: 8.41 K/UL — SIGNIFICANT CHANGE UP (ref 4.8–10.8)

## 2018-10-12 RX ORDER — ALBUTEROL 90 UG/1
2 AEROSOL, METERED ORAL EVERY 4 HOURS
Qty: 0 | Refills: 0 | Status: DISCONTINUED | OUTPATIENT
Start: 2018-10-12 | End: 2018-10-13

## 2018-10-12 RX ADMIN — Medication 25 MILLIGRAM(S): at 05:55

## 2018-10-12 RX ADMIN — MORPHINE SULFATE 4 MILLIGRAM(S): 50 CAPSULE, EXTENDED RELEASE ORAL at 19:48

## 2018-10-12 RX ADMIN — Medication 25 MILLIGRAM(S): at 21:04

## 2018-10-12 RX ADMIN — Medication 25 MILLIGRAM(S): at 14:11

## 2018-10-12 RX ADMIN — MORPHINE SULFATE 4 MILLIGRAM(S): 50 CAPSULE, EXTENDED RELEASE ORAL at 20:30

## 2018-10-12 RX ADMIN — Medication 81 MILLIGRAM(S): at 11:39

## 2018-10-12 RX ADMIN — ISOSORBIDE MONONITRATE 30 MILLIGRAM(S): 60 TABLET, EXTENDED RELEASE ORAL at 11:39

## 2018-10-12 RX ADMIN — MORPHINE SULFATE 4 MILLIGRAM(S): 50 CAPSULE, EXTENDED RELEASE ORAL at 11:42

## 2018-10-12 RX ADMIN — ENOXAPARIN SODIUM 40 MILLIGRAM(S): 100 INJECTION SUBCUTANEOUS at 11:39

## 2018-10-12 RX ADMIN — LISINOPRIL 40 MILLIGRAM(S): 2.5 TABLET ORAL at 05:55

## 2018-10-12 RX ADMIN — ATORVASTATIN CALCIUM 10 MILLIGRAM(S): 80 TABLET, FILM COATED ORAL at 21:04

## 2018-10-12 RX ADMIN — TICAGRELOR 90 MILLIGRAM(S): 90 TABLET ORAL at 11:38

## 2018-10-12 RX ADMIN — Medication 4 MILLIGRAM(S): at 21:04

## 2018-10-12 RX ADMIN — MORPHINE SULFATE 4 MILLIGRAM(S): 50 CAPSULE, EXTENDED RELEASE ORAL at 12:26

## 2018-10-12 NOTE — PROGRESS NOTE ADULT - ASSESSMENT
chest pain of unclear etiology  Hx of HTN, ASHD, CAD, sp PCI  Hx of Hyperlipidemia, hepatic steatosis  Hx of obesity BMI 38.8, possible SOBEIDA, may w/up as out pt with sleep studies  Hx of COPD, ex tobacco use  Hx of BPH    cardiac enzymes, EKG, ECHO  Cardiology consult  continue all meds  sono of abdomen shows no sig pathology, bu organs obscured by gas, may need CT of abdomen  order CT of the abdomen, pt cont to c/o RUQ pain  order CT of the head pt cont to c/o light headedness   order venous duplex of carotids  att to possible DDD of C spine, CT of the neck was negative for any acute pathology, straightening of the lordotic curve probably due to mm spasm

## 2018-10-12 NOTE — PROGRESS NOTE ADULT - SUBJECTIVE AND OBJECTIVE BOX
SUBJECTIVE:    Patient is a 58y old Male who presents with a chief complaint of L sided CP (12 Oct 2018 08:49)    Currently admitted to medicine with the primary diagnosis of Chest pain.      Today is hospital day 1d. This morning he is resting comfortably in bed and reports no new issues or overnight events and needs follow.     PAST MEDICAL & SURGICAL HISTORY  Dyslipidemia  HTN (hypertension)  CAD (coronary artery disease)  Coronary artery disease: s/p PCI x1  History of umbilical hernia repair    SOCIAL HISTORY:  Negative for smoking/alcohol/drug use.     ALLERGIES:  No Known Allergies    MEDICATIONS:  STANDING MEDICATIONS  aspirin  chewable 81 milliGRAM(s) Oral daily  atorvastatin 10 milliGRAM(s) Oral at bedtime  doxazosin 4 milliGRAM(s) Oral at bedtime  enoxaparin Injectable 40 milliGRAM(s) SubCutaneous daily  hydrochlorothiazide 25 milliGRAM(s) Oral daily  isosorbide   mononitrate ER Tablet (IMDUR) 30 milliGRAM(s) Oral daily  lisinopril 40 milliGRAM(s) Oral daily  meclizine 25 milliGRAM(s) Oral three times a day  metoprolol succinate ER 25 milliGRAM(s) Oral daily  ticagrelor 90 milliGRAM(s) Oral two times a day    PRN MEDICATIONS  ALBUTerol    90 MICROgram(s) HFA Inhaler 2 Puff(s) Inhalation every 4 hours PRN  morphine  - Injectable 4 milliGRAM(s) IV Push every 4 hours PRN    VITALS:   T(F): 97  HR: 55  BP: 132/62  RR: 18  SpO2: 97%    LABS:                        12.7   8.41  )-----------( 178      ( 12 Oct 2018 06:25 )             38.0     10-12    141  |  99  |  21<H>  ----------------------------<  105<H>  4.1   |  32  |  1.0    Ca    9.2      12 Oct 2018 06:25  Phos  4.1     10-12  Mg     1.8     10-12    TPro  6.3  /  Alb  3.7  /  TBili  0.2  /  DBili  x   /  AST  23  /  ALT  30  /  AlkPhos  63  10-12    PT/INR - ( 10 Oct 2018 22:39 )   PT: 11.10 sec;   INR: 1.03 ratio         PTT - ( 10 Oct 2018 22:39 )  PTT:33.7 sec      CARDIAC MARKERS ( 11 Oct 2018 11:10 )  x     / <0.01 ng/mL / 207 U/L / x     / 2.8 ng/mL  CARDIAC MARKERS ( 11 Oct 2018 08:27 )  x     / <0.01 ng/mL / 222 U/L / x     / 2.6 ng/mL  CARDIAC MARKERS ( 10 Oct 2018 22:39 )  x     / <0.01 ng/mL / x     / x     / x          RADIOLOGY:  < from: US Abdomen Limited (10.11.18 @ 01:26) >  Gallbladder secured by overlying bowel gas.  Hepatic steatosis.    < end of copied text >      PHYSICAL EXAM:  GEN: No acute distress  LUNGS: Clear to auscultation bilaterally   HEART: S1/S2 present. RRR.   ABD: Soft, non-tender, non-distended. Bowel sounds present  EXT: NC/NC/NE/2+PP/DEWEY  NEURO: AAOX3

## 2018-10-12 NOTE — PROGRESS NOTE ADULT - SUBJECTIVE AND OBJECTIVE BOX
Patient was seen and examined by me in F9    He appears comfortable in bed.  Still complaining of intermittent episodes of sharp left sided chest pain.  He also now complains of RUQ tenderness.  He denies any exertional chest pain when he is ambulating in the unit.    Vitals:  T(C): 35.9 (10-12-18 @ 06:00), Max: 36.9 (10-11-18 @ 23:22)  HR: 58 (10-12-18 @ 06:00) (52 - 89)  BP: 131/70 (10-12-18 @ 06:00) (116/73 - 150/78)  RR: 20 (10-12-18 @ 06:00) (18 - 20)  SpO2: 97% (10-12-18 @ 08:03) (96% - 98%)    Telemetry: Sinus    LABS:                        12.7   8.41  )-----------( 178      ( 12 Oct 2018 06:25 )             38.0     10-12    141  |  99  |  21<H>  ----------------------------<  105<H>  4.1   |  32  |  1.0    Ca    9.2      12 Oct 2018 06:25  Phos  4.1     10-12  Mg     1.8     10-12    TPro  6.3  /  Alb  3.7  /  TBili  0.2  /  DBili  x   /  AST  23  /  ALT  30  /  AlkPhos  63  10-12    PT/INR - ( 10 Oct 2018 22:39 )   PT: 11.10 sec;   INR: 1.03 ratio         PTT - ( 10 Oct 2018 22:39 )  PTT:33.7 sec  MEDICATIONS  (STANDING):  aspirin  chewable 81 milliGRAM(s) Oral daily  atorvastatin 10 milliGRAM(s) Oral at bedtime  doxazosin 4 milliGRAM(s) Oral at bedtime  enoxaparin Injectable 40 milliGRAM(s) SubCutaneous daily  hydrochlorothiazide 25 milliGRAM(s) Oral daily  isosorbide   mononitrate ER Tablet (IMDUR) 30 milliGRAM(s) Oral daily  lisinopril 40 milliGRAM(s) Oral daily  meclizine 25 milliGRAM(s) Oral three times a day  metoprolol succinate ER 25 milliGRAM(s) Oral daily  ticagrelor 90 milliGRAM(s) Oral two times a day    MEDICATIONS  (PRN):  ALBUTerol    90 MICROgram(s) HFA Inhaler 2 Puff(s) Inhalation every 4 hours PRN Bronchospasm  morphine  - Injectable 4 milliGRAM(s) IV Push every 4 hours PRN Moderate Pain (4 - 6)      PHYSICAL EXAM:  Not in distress  alert, oriented x 3  no JVD; regular rhythm, nl S1S2  clear breath sounds  abdomen soft, no guarding  no edema  no focal neurologic deficits

## 2018-10-12 NOTE — PROGRESS NOTE ADULT - SUBJECTIVE AND OBJECTIVE BOX
MIKAL OLIVAS 59yo WMale came to the ER c/o L sided CP x few weeks but inc in frequency and intensity last few days.  Th CP is L sided, sharp, assoc with lightheadedness and SOB. The pt deniesN/V/diaphoresis and LOC.  The PMHx includes HTN, ASHD, CAD, sp PCI, Hyperlipidemia, BPH.    INTERVAL HPI/OVERNIGHT EVENTS:  CE are negative, EKG is normal pt was evaluated by Cardio, no stress test planned, to look for other etiology of the CP ? musculoskeletal, cervicalgia GI ie GB, cervicalgia    MEDICATIONS  (STANDING):  aspirin  chewable 81 milliGRAM(s) Oral daily  atorvastatin 10 milliGRAM(s) Oral at bedtime  doxazosin 4 milliGRAM(s) Oral at bedtime  enoxaparin Injectable 40 milliGRAM(s) SubCutaneous daily  hydrochlorothiazide 25 milliGRAM(s) Oral daily  isosorbide   mononitrate ER Tablet (IMDUR) 30 milliGRAM(s) Oral daily  lisinopril 40 milliGRAM(s) Oral daily  meclizine 25 milliGRAM(s) Oral three times a day  metoprolol succinate ER 25 milliGRAM(s) Oral daily  ticagrelor 90 milliGRAM(s) Oral two times a day    MEDICATIONS  (PRN):  morphine  - Injectable 4 milliGRAM(s) IV Push every 4 hours PRN Moderate Pain (4 - 6)      Allergies  NKDA    Vital Signs Last 24 Hrs  T(F): 98  HR: 62   BP: 133/6  RR: 18  pO2: 97%   .5 kg,  Wt 5-10'',  BMI 38.8    PHYSICAL EXAM:      Constitutional:  alert, oriented, in NAD, overweight WM    Eyes:  normal    ENMT:  dry oral mucosa    Neck:  supple, no JVD no bruits    Respiratory:  shallow respirations but no rales, crackles or wheezing    Cardiovascular:  S1S2 reg    Gastrointestinal:  globose and obese soft and benign    Extremities:  moves all ext, no edema      LABS:                        12.6   8.33  )-----------( 187      ( 10 Oct 2018 22:39 )             37.2     10-10    143  |  102  |  20  ----------------------------<  121<H>  4.2   |  27  |  1.0    TC  169, TRI  276, HDL 38,      ,207  MB 2.6, 2.8  trop 0.01 x 3    Ca    9.1      10 Oct 2018 22:39    TPro  6.8  /  Alb  4.0  /  TBili  0.2  /  DBili  <0.2  /  AST  33  /  ALT  35  /  AlkPhos  65  10-10    PT/INR - ( 10 Oct 2018 22:39 )   PT: 11.10 sec;   INR: 1.03 ratio         PTT - ( 10 Oct 2018 22:39 )  PTT:33.7 sec      RADIOLOGY & ADDITIONAL TESTS:  CXR no infiltrates  sono of abd no acute pathology, + hepatic steatosis  EKG  NSR  75/min no acute changes  CT of the neck shows no acute pathology, + straightening of the lordotic curve

## 2018-10-12 NOTE — PROGRESS NOTE ADULT - ASSESSMENT
1] Atypical Chest Pain - doubt cardiac.  Cervical Radiculopathy?      Known CAD S/P MI S/P PTCA/Stent  - No planned stress test   - Patient is physically active and exercises 3-4 times a week at home using treadmill.  He denies any exertional chest pain.   - Maintain DAPT    2] Lightheadedness  - Patient complains of lightheadedness with his episodes of sharp chest pain  - Further plan as per primary team    3] Hypertension  - continue present medications    4] Hyperlipidemia   - on statin therapy    5] DJD of Cervical Neck  - Patient has history of bulging discs in cervical neck spine (Had MRI as outpatient)    Plan discussed with House Staff  DVT prophylaxis    May discontinue telemetry monitoring  Discharge planning as per primary team  Will follow as needed    Blaze Vee MD   [phone] Office

## 2018-10-12 NOTE — PROGRESS NOTE ADULT - ASSESSMENT
1. ATYPICAL L sided episodic chest pain   -Hx of CAD s/p PCI x1, r/o Ischemic Heart Disease  - CE are negative and No EKG changes   - / Mariusz saw the patient and no stress test this time   -c/w asa, brilinta, statin, isosorbide mononitrate ER, metoprolol succ ER  - Rule out neuropathic pain   - Follow up wiith the thoracic spine xray and ct CERVICAL SPINE pending     2 Dizziness  - C/w MEclizine     3. HTN: c/w hctz, ace-i, metoprolol succ ER    4. BPH: c/w doxazosin    5. DVT PPx: lovenox sq

## 2018-10-13 ENCOUNTER — TRANSCRIPTION ENCOUNTER (OUTPATIENT)
Age: 58
End: 2018-10-13

## 2018-10-13 VITALS
TEMPERATURE: 97 F | RESPIRATION RATE: 18 BRPM | HEART RATE: 63 BPM | DIASTOLIC BLOOD PRESSURE: 62 MMHG | SYSTOLIC BLOOD PRESSURE: 128 MMHG

## 2018-10-13 RX ADMIN — Medication 25 MILLIGRAM(S): at 06:05

## 2018-10-13 RX ADMIN — TICAGRELOR 90 MILLIGRAM(S): 90 TABLET ORAL at 11:38

## 2018-10-13 RX ADMIN — Medication 81 MILLIGRAM(S): at 11:37

## 2018-10-13 RX ADMIN — ISOSORBIDE MONONITRATE 30 MILLIGRAM(S): 60 TABLET, EXTENDED RELEASE ORAL at 11:37

## 2018-10-13 RX ADMIN — MORPHINE SULFATE 4 MILLIGRAM(S): 50 CAPSULE, EXTENDED RELEASE ORAL at 00:00

## 2018-10-13 RX ADMIN — LISINOPRIL 40 MILLIGRAM(S): 2.5 TABLET ORAL at 06:05

## 2018-10-13 RX ADMIN — MORPHINE SULFATE 4 MILLIGRAM(S): 50 CAPSULE, EXTENDED RELEASE ORAL at 11:34

## 2018-10-13 RX ADMIN — ENOXAPARIN SODIUM 40 MILLIGRAM(S): 100 INJECTION SUBCUTANEOUS at 11:37

## 2018-10-13 RX ADMIN — Medication 25 MILLIGRAM(S): at 14:11

## 2018-10-13 RX ADMIN — TICAGRELOR 90 MILLIGRAM(S): 90 TABLET ORAL at 00:00

## 2018-10-13 RX ADMIN — MORPHINE SULFATE 4 MILLIGRAM(S): 50 CAPSULE, EXTENDED RELEASE ORAL at 00:02

## 2018-10-13 RX ADMIN — MORPHINE SULFATE 4 MILLIGRAM(S): 50 CAPSULE, EXTENDED RELEASE ORAL at 09:10

## 2018-10-13 NOTE — PROGRESS NOTE ADULT - ASSESSMENT
chest pain probably musculoskeletal in etiology  light headedness, dizzyness probably due to ethmoid sinus changes  Hx of HTN, ASHD, CAD, sp PCI  Hx of Hyperlipidemia, hepatic steatosis  Hx of obesity BMI 38.8, SOBEIDA  Hx of COPD, ex tobacco use  Hx of BPH    cardiac enzymes, EKG is normal  Cardiology consult appreciated, no further cardiac w/up needed  continue all meds  sono of abdomen shows no sig pathology, bu organs obscured by gas, may need CT of abdomen  CT of the abdomen: R renal cyst, no acute p[athology  CT of the head pt cont to c/o light headedness:  normal brain, L ethmoid sinus changes, appear chronic   CT of the neck was negative for any acute pathology, straightening of the lordotic curve probably due to mm spasm  discussed all the fx with the pt, pt medically stable for D/C   continue meclizine, nasal sinus flushes ff by fluticasone nasal spray, will refer to ENT as out pt

## 2018-10-13 NOTE — DISCHARGE NOTE ADULT - CARE PLAN
Principal Discharge DX:	Chest pain, unspecified type  Goal:	medical therapy  Assessment and plan of treatment:	not cardiac, continue with meds and f/u with PMD  Secondary Diagnosis:	Dyslipidemia  Goal:	medical therapy  Assessment and plan of treatment:	continue with statin  Secondary Diagnosis:	Hypertension, unspecified type  Goal:	medical therapy  Assessment and plan of treatment:	continue with meds, check blood pressure  Secondary Diagnosis:	Other chronic sinusitis  Goal:	medical therapy  Assessment and plan of treatment:	continue with flonase and conservative therapy

## 2018-10-13 NOTE — DISCHARGE NOTE ADULT - PLAN OF CARE
medical therapy not cardiac, continue with meds and f/u with PMD continue with statin continue with meds, check blood pressure continue with flonase and conservative therapy Call Primary Care Provider for follow-up after discharge/Low salt diet/Report weight gain of 2 or more pounds in one day or 3 or more pounds in one week, worsening shortness of breath, fatigue, weakness, increased swelling of hands and feet to primary care provider/Activities as tolerated/Monitor Weight Daily

## 2018-10-13 NOTE — DISCHARGE NOTE ADULT - CARE PROVIDER_API CALL
Claudette Guadalupe), Medicine  [address] [place]  [address]  [place] [address]  Phone: [phone]  Fax: [phone]    Blaze Vee), Cardiovascular Disease; Nuclear Cardiology  [address] [place] [address]  [address]  [place] [address]  Phone: [phone]  Fax: [phone]

## 2018-10-13 NOTE — CHART NOTE - NSCHARTNOTEFT_GEN_A_CORE
<<<RESIDENT DISCHARGE NOTE>>>     MIKAL OLIVAS  MRN-[id number]    VITAL SIGNS:  T(F): 96.9 (10-13-18 @ 13:14), Max: 98 (10-12-18 @ 20:20)  HR: 63 (10-13-18 @ 13:14)  BP: 128/62 (10-13-18 @ 13:14)  SpO2: 97% (10-13-18 @ 06:00)      PHYSICAL EXAMINATION:  General: NAD  Head & Neck: EOMI PERRLA no JVD  Pulmonary: CTA no wheezing  Cardiovascular: rs1s2 no murmur   Gastrointestinal/Abdomen & Pelvis: obese soft nontender   Neurologic/Motor: AAO4 no motor or sensory deficit    TEST RESULTS:                        12.7   8.41  )-----------( 178      ( 12 Oct 2018 06:25 )             38.0       10-12    141  |  99  |  21<H>  ----------------------------<  105<H>  4.1   |  32  |  1.0    Ca    9.2      12 Oct 2018 06:25  Phos  4.1     10-12  Mg     1.8     10-12    TPro  6.3  /  Alb  3.7  /  TBili  0.2  /  DBili  x   /  AST  23  /  ALT  30  /  AlkPhos  63  10-12    Patient presented for headache, chest pain radiating to his left shoulder. ACS was ruled out, CT head done showed sinusitis, and abdominal CT showed no acute pathology/. Prescribed therapy for sinusitis and instructed to FU with PMD.    FINAL DISCHARGE INTERVIEW:  Resident(s) Present: (Name: IVON Tripp, RN Present: (Name:  Caitlyn Soto _________)    DISCHARGE MEDICATION RECONCILIATION  reviewed with Attending (Name:____Dr Guadalupe_______)    DISPOSITION:   [X  ] Home,    [  ] Home with Visiting Nursing Services,   [    ]  SNF/ NH,    [   ] Acute Rehab (4A),   [   ] Other (Specify:_________)

## 2018-10-13 NOTE — DISCHARGE NOTE ADULT - PATIENT PORTAL LINK FT
You can access the [facility] Portal, offered by [facility], by registering with the following website: [email or web address]

## 2018-10-13 NOTE — DISCHARGE NOTE ADULT - HOSPITAL COURSE
57 y/o M with CAD s/p PCI x1 ([place], with Dr Mcrae, 1/2018), HTN, DLD, BPH, presenting for L sided chest pain. For the last couple of months he notices that he has difficulty tying his shoelaces or takes a longer time to walk up the stairs. At the same time, he endorses being able to walk 30min on the treadmill 3-4x/week with no difficulty. For the past couple of weeks, he has noticed L sided sharp chest pain, almost electrical in nature, it comes and goes, lasting for a couple seconds, happening once every couple of days, not precipitated by any activity. Cardiac enzymes trended and were negative. CT head done showed chronic sinusitis. CT abdomen done showed no acute pathology. Patient's symptoms resolved. Instructed to follow with primary Dr and follow with cardiologist, continue meds.

## 2018-10-13 NOTE — DISCHARGE NOTE ADULT - MEDICATION SUMMARY - MEDICATIONS TO TAKE
I will START or STAY ON the medications listed below when I get home from the hospital:    aspirin 81 mg oral tablet, chewable  -- 1 tab(s) by mouth once a day  -- Indication: For CAD (coronary artery disease)    benazepril 40 mg oral tablet  -- 1 tab(s) by mouth once a day  -- Indication: For HTN (hypertension)    doxazosin 4 mg oral tablet  -- 1 tab(s) by mouth once a day  -- Indication: For bph    isosorbide mononitrate 30 mg oral tablet, extended release  -- 1 tab(s) by mouth once a day (in the morning)  -- Indication: For CAD (coronary artery disease)    pravastatin 10 mg oral tablet  -- 1 tab(s) by mouth once a day  -- Indication: For Dld    Brilinta (ticagrelor) 90 mg oral tablet  -- 1 tab(s) by mouth 2 times a day  -- Indication: For CAD (coronary artery disease)    Metoprolol Succinate ER 25 mg oral tablet, extended release  -- 1 tab(s) by mouth once a day  -- Indication: For tachy    hydroCHLOROthiazide 25 mg oral tablet  -- 1 tab(s) by mouth once a day  -- Indication: For HTN (hypertension)

## 2018-10-13 NOTE — PROGRESS NOTE ADULT - SUBJECTIVE AND OBJECTIVE BOX
MIKAL OLIVAS 57yo WMale came to the ER c/o L sided CP x few weeks but inc in frequency and intensity last few days.  Th CP is L sided, sharp, assoc with lightheadedness and SOB. The pt deniesN/V/diaphoresis and LOC.  The PMHx includes HTN, ASHD, CAD, sp PCI, Hyperlipidemia, BPH.  During the hospital course the pt had CE whic were negative, EKGs were normal, cardiac evaluation suggested CP of noncardiac origin, ultrasound of abd nondiagnostic, CT of the head no acute pathology except for  ethmoid sinus changes ( which may be responsible for the c/o dizziness),  CT of the neck + straightening of the lordotic curve but no acute pathology, CT of the abdomen showed hepatic steatosis, R renal cyst 4.1cm and BL inguinal hernias.  The pt is medically stable and no further w/up is warranted.      INTERVAL HPI/OVERNIGHT EVENTS:  CE are negative, EKG is normal pt was evaluated by Cardio, no stress test planned, to look for other etiology of the CP ? musculoskeletal, cervicalgia GI ie GB, cervicalgia    MEDICATIONS  (STANDING):  aspirin  chewable 81 milliGRAM(s) Oral daily  atorvastatin 10 milliGRAM(s) Oral at bedtime  doxazosin 4 milliGRAM(s) Oral at bedtime  enoxaparin Injectable 40 milliGRAM(s) SubCutaneous daily  hydrochlorothiazide 25 milliGRAM(s) Oral daily  isosorbide   mononitrate ER Tablet (IMDUR) 30 milliGRAM(s) Oral daily  lisinopril 40 milliGRAM(s) Oral daily  meclizine 25 milliGRAM(s) Oral three times a day  metoprolol succinate ER 25 milliGRAM(s) Oral daily  ticagrelor 90 milliGRAM(s) Oral two times a day    MEDICATIONS  (PRN):  morphine  - Injectable 4 milliGRAM(s) IV Push every 4 hours PRN Moderate Pain (4 - 6)      Allergies  NKDA    Vital Signs Last 24 Hrs  T(F): 97.7  HR: 60   BP: 133/63  RR: 18  pO2: 97%   .5 kg,  Wt 5-10'',  BMI 38.8    PHYSICAL EXAM:      Constitutional:  alert, oriented, in NAD, overweight WM    Eyes:  normal    ENMT:  dry oral mucosa    Neck:  supple, no JVD no bruits    Respiratory:  shallow respirations but no rales, crackles or wheezing    Cardiovascular:  S1S2 reg    Gastrointestinal:  globose and obese and distended but soft and benign, + normal BS    Extremities:  moves all ext, no edema      LABS:                        12.6   8.33  )-----------( 187      ( 10 Oct 2018 22:39 )             37.2     10-10    143  |  102  |  20  ----------------------------<  121<H>  4.2   |  27  |  1.0    TC  169, TRI  276, HDL 38,      ,207  MB 2.6, 2.8  trop 0.01 x 3    Ca    9.1      10 Oct 2018 22:39    TPro  6.8  /  Alb  4.0  /  TBili  0.2  /  DBili  <0.2  /  AST  33  /  ALT  35  /  AlkPhos  65  10-10    PT/INR - ( 10 Oct 2018 22:39 )   PT: 11.10 sec;   INR: 1.03 ratio         PTT - ( 10 Oct 2018 22:39 )  PTT:33.7 sec      RADIOLOGY & ADDITIONAL TESTS:  CXR no infiltrates  sono of abd no acute pathology, + hepatic steatosis  EKG  NSR  75/min no acute changes    CT of the head:  normal ventricles, no acute hemorrhage, no midline shift, expanded L ethmoid air cells to 1.6x 2.2cm with thinninb and bowing of the  lamina papyracea impinging on the  L medial rectus mm and  fovea ethmoidalis    CT of the neck shows no acute pathology, + straightening of the lordotic curve    CT of abdomen:  lower chest unremarkable, hepatic steatosis, spleen, pancreas, adrenal glands, kidneys WNL, R renal  cyst 4.1 cm, no abd LN, no ascites,  post appendectomy, BL inguinal hernias L>R , + vascular calcifications, imp:  no acute abdominal pathology

## 2018-10-19 DIAGNOSIS — K76.0 FATTY (CHANGE OF) LIVER, NOT ELSEWHERE CLASSIFIED: ICD-10-CM

## 2018-10-19 DIAGNOSIS — Z95.5 PRESENCE OF CORONARY ANGIOPLASTY IMPLANT AND GRAFT: ICD-10-CM

## 2018-10-19 DIAGNOSIS — I25.10 ATHEROSCLEROTIC HEART DISEASE OF NATIVE CORONARY ARTERY WITHOUT ANGINA PECTORIS: ICD-10-CM

## 2018-10-19 DIAGNOSIS — R07.89 OTHER CHEST PAIN: ICD-10-CM

## 2018-10-19 DIAGNOSIS — E78.5 HYPERLIPIDEMIA, UNSPECIFIED: ICD-10-CM

## 2018-10-19 DIAGNOSIS — R07.9 CHEST PAIN, UNSPECIFIED: ICD-10-CM

## 2018-10-19 DIAGNOSIS — N28.1 CYST OF KIDNEY, ACQUIRED: ICD-10-CM

## 2018-10-19 DIAGNOSIS — N40.0 BENIGN PROSTATIC HYPERPLASIA WITHOUT LOWER URINARY TRACT SYMPTOMS: ICD-10-CM

## 2018-10-19 DIAGNOSIS — Z87.891 PERSONAL HISTORY OF NICOTINE DEPENDENCE: ICD-10-CM

## 2018-10-19 DIAGNOSIS — J32.9 CHRONIC SINUSITIS, UNSPECIFIED: ICD-10-CM

## 2018-10-19 DIAGNOSIS — M47.22 OTHER SPONDYLOSIS WITH RADICULOPATHY, CERVICAL REGION: ICD-10-CM

## 2018-10-19 DIAGNOSIS — G47.33 OBSTRUCTIVE SLEEP APNEA (ADULT) (PEDIATRIC): ICD-10-CM

## 2018-10-19 DIAGNOSIS — I10 ESSENTIAL (PRIMARY) HYPERTENSION: ICD-10-CM

## 2018-10-19 DIAGNOSIS — J44.9 CHRONIC OBSTRUCTIVE PULMONARY DISEASE, UNSPECIFIED: ICD-10-CM

## 2019-05-09 ENCOUNTER — APPOINTMENT (OUTPATIENT)
Dept: VASCULAR SURGERY | Facility: CLINIC | Age: 59
End: 2019-05-09

## 2020-09-02 ENCOUNTER — EMERGENCY (EMERGENCY)
Facility: HOSPITAL | Age: 60
LOS: 0 days | Discharge: HOME | End: 2020-09-02
Attending: EMERGENCY MEDICINE | Admitting: EMERGENCY MEDICINE
Payer: OTHER MISCELLANEOUS

## 2020-09-02 VITALS
SYSTOLIC BLOOD PRESSURE: 152 MMHG | HEART RATE: 88 BPM | HEIGHT: 70 IN | TEMPERATURE: 99 F | RESPIRATION RATE: 18 BRPM | WEIGHT: 265 LBS | DIASTOLIC BLOOD PRESSURE: 88 MMHG | OXYGEN SATURATION: 97 %
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
## 2020-09-02 DIAGNOSIS — M79.672 PAIN IN LEFT FOOT: ICD-10-CM
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
## 2020-09-02 DIAGNOSIS — I25.10 ATHEROSCLEROTIC HEART DISEASE OF NATIVE CORONARY ARTERY WITHOUT ANGINA PECTORIS: Chronic | ICD-10-CM

## 2020-09-02 DIAGNOSIS — Z98.890 OTHER SPECIFIED POSTPROCEDURAL STATES: Chronic | ICD-10-CM

## 2020-09-02 DIAGNOSIS — M79.673 PAIN IN UNSPECIFIED FOOT: ICD-10-CM

## 2020-09-02 PROBLEM — I10 ESSENTIAL (PRIMARY) HYPERTENSION: Chronic | Status: ACTIVE | Noted: 2018-10-11

## 2020-09-02 PROBLEM — E78.5 HYPERLIPIDEMIA, UNSPECIFIED: Chronic | Status: ACTIVE | Noted: 2018-10-11

## 2020-09-02 PROCEDURE — 99053 MED SERV 10PM-8AM 24 HR FAC: CPT

## 2020-09-02 PROCEDURE — 73630 X-RAY EXAM OF FOOT: CPT | Mod: 26,LT

## 2020-09-02 PROCEDURE — 99283 EMERGENCY DEPT VISIT LOW MDM: CPT

## 2020-09-02 NOTE — ED ADULT NURSE NOTE - MODE OF DISCHARGE
Ambulatory with cane/crutches/walker/demonstrated understanding in left foot none wt bearing 3 point ambulation safely

## 2020-09-02 NOTE — ED PROVIDER NOTE - PROGRESS NOTE DETAILS
results discussed placed in darco shoe will dc with podiatry follow up ATTENDING NOTE: 59 y/o M PMH DLD, HTN and CAD p/w atraumatic L foot pain. Pt states that on Monday while at work he was doing heavy lifting and felt discomfort at that time. Tuesday pain worsened but Pt persisted through the day and today states pain is at its worst. Pt is unable to bear weight and has limited ROM secondary to pain. Pt states that he has no pain anywhere else and this has never happened before in the past. No fevers, chills, CP, knee pain, numbness, weakness or tingling in the extremity. On exam: NCAT. RRR, S1S2 noted. Pedal pulses 2+ and equal. FROM x3 extremities, (+) TTP over 5th metatarsal, (+) Mild redness, (+) Limited ROM in L foot. No focal neuro deficits. A/P: XR and reassess.

## 2020-09-02 NOTE — ED PROVIDER NOTE - CLINICAL SUMMARY MEDICAL DECISION MAKING FREE TEXT BOX
patient placed in post op shoe with follow up to podiatry with no signs of fevers or chills no ascending redness no signs of infection at this time

## 2020-09-02 NOTE — ED PROVIDER NOTE - ATTENDING CONTRIBUTION TO CARE
I was present for and supervised the key and critical aspects of the procedures performed during the care of the patient. ATTENDING NOTE: 61 y/o M PMH DLD, HTN and CAD p/w atraumatic L foot pain. Pt states that on Monday while at work he was doing heavy lifting and felt discomfort at that time. Tuesday pain worsened but Pt persisted through the day and today states pain is at its worst. Pt is unable to bear weight and has limited ROM secondary to pain. Pt states that he has no pain anywhere else and this has never happened before in the past. No fevers, chills, CP, knee pain, numbness, weakness or tingling in the extremity. On exam: NCAT. RRR, S1S2 noted. Pedal pulses 2+ and equal. FROM x3 extremities, (+) TTP over 5th metatarsal, (+) Mild redness, (+) Limited ROM in L foot. No focal neuro deficits. A/P: XR and reassess.

## 2020-09-02 NOTE — ED PROVIDER NOTE - NS ED ROS FT
Review of Systems:  	•	CONSTITUTIONAL - no fever, no diaphoresis, no chills  	•	SKIN - no rash  	•	HEMATOLOGIC - no bleeding, no bruising  	•	EYES - no eye pain, no blurry vision  	•	ENT - no change in hearing, no sore throat, no ear pain or tinnitus  	•	RESPIRATORY - no shortness of breath, no cough      	•	MUSCULOSKELETAL left foot pain t, no swelling, no redness  	•	NEUROLOGIC - no weakness, no headache, no paresthesias, no LOC  	•	PSYCH - no anxiety, non suicidal, non homicidal, no hallucination, no depression

## 2020-09-02 NOTE — ED PROVIDER NOTE - PHYSICAL EXAMINATION
--EXAM--  VITAL SIGNS: I have reviewed vs documented at present.  CONSTITUTIONAL: Well-developed; well-nourished; in no acute distress.   SKIN: Warm and dry, no acute rash.   HEAD: Normocephalic; atraumatic.  EYES: PERRL, EOM intact; conjunctiva and sclera clear. No nystagmus.  ENT: No nasal discharge; airway clear.  NECK: Supple; non tender.  CARD: S1, S2, Regular rate and rhythm.   RESP: No wheezes, rales or rhonchi.  ABD: Normal bowel sounds; soft; non-distended; tenderness to left 5th metatarsal no swelling no deformity   EXT: Normal ROM.   NEURO: Alert, oriented, grossly unremarkable. Strength 5/5 in all extremities. Sensation intact throughout.  PSYCH: Cooperative, appropriate.

## 2020-09-02 NOTE — ED PROVIDER NOTE - OBJECTIVE STATEMENT
this is 59 yo male presents to ed for evaluation of left foot . patient states that he is walking a lot more last few days at work . patient states that he is having pain ambulating

## 2020-09-02 NOTE — ED PROVIDER NOTE - PATIENT PORTAL LINK FT
You can access the FollowMyHealth Patient Portal offered by [facility] by registering at the following website: [email or web address]. By joining [employer]’s FollowMyHealth portal, you will also be able to view your health information using other applications (apps) compatible with our system.

## 2020-09-02 NOTE — ED PROVIDER NOTE - NSFOLLOWUPINSTRUCTIONS_ED_ALL_ED_FT
Patient to be discharged from ED. Any available test results were discussed with patient and/or family. Verbal instructions given, including instructions to return to ED immediately for any new, worsening, or concerning symptoms. Patient endorsed understanding. Written discharge instructions additionally given, including follow-up plan.      follow up with podiatry

## 2020-09-02 NOTE — ED ADULT TRIAGE NOTE - HEIGHT IN INCHES
Received referral from [facility]. Met with pt at the bedside. Pt is agreeable to [facility] services at d/c. Brochure and liaison card provided. Any pt questions addressed. Will follow. 10

## 2021-03-07 ENCOUNTER — TRANSCRIPTION ENCOUNTER (OUTPATIENT)
Age: 61
End: 2021-03-07

## 2022-04-06 NOTE — ED ADULT NURSE NOTE - CARDIO ASSESSMENT
Implemented All Universal Safety Interventions:  [place] to call system. Call bell, personal items and telephone within reach. Instruct patient to call for assistance. Room bathroom lighting operational. Non-slip footwear when patient is off stretcher. Physically safe environment: no spills, clutter or unnecessary equipment. Stretcher in lowest position, wheels locked, appropriate side rails in place. ---

## 2022-04-28 NOTE — ED ADULT TRIAGE NOTE - HEIGHT IN FEET
Outreach attempt was made to schedule a Medicare Wellness Visit. This was the second attempt. Contact was not made, left message.  
5

## 2022-08-31 NOTE — PATIENT PROFILE ADULT - NSPROEDALEARNPREF_GEN_A_NUR
CHW - Follow Up    This Community Health Worker completed a follow up visit with patient via telephone today.  Pt/Caregiver reported: The pt informed CHW that she was not able to make therapy apt as of yet.   Community Health Worker provided: CHW encouraged the client.  Follow up required: Yes   Follow-up Outreach - Due: 9/6/2022       verbal instruction

## 2022-12-02 ENCOUNTER — APPOINTMENT (OUTPATIENT)
Age: 62
End: 2022-12-02

## 2022-12-02 VITALS
WEIGHT: 240 LBS | OXYGEN SATURATION: 98 % | SYSTOLIC BLOOD PRESSURE: 130 MMHG | HEIGHT: 69 IN | RESPIRATION RATE: 12 BRPM | BODY MASS INDEX: 35.55 KG/M2 | DIASTOLIC BLOOD PRESSURE: 80 MMHG | HEART RATE: 67 BPM

## 2022-12-02 PROCEDURE — 99204 OFFICE O/P NEW MOD 45 MIN: CPT

## 2022-12-02 NOTE — REASON FOR VISIT
[Initial] : an initial visit [Sleep Apnea] : sleep apnea [COPD] : COPD [Obesity] : obesity [TextBox_44] : Patient was diagnosed many years ago with sleep apnea.  He has a CPAP machine which she faithfully uses but was lost to follow-up.  He states the machine is at least 10 years old.\par \par The patient was also a heavy smoker for about 43 years a pack a day.  He has stopped about 40 years ago.  He has never had a CAT scan of his chest performed.  Finally the patient has a diagnosis of COPD.  He only uses an albuterol 1-2 times a week.  He feels much better since he stopped smoking.

## 2022-12-02 NOTE — ASSESSMENT
[FreeTextEntry1] : Assessment:\par SOBEIDA\par Obesity\par \par PLAN:\par The patient is benefitting from the PAP device . He needs a new CPAP unit\par New supplies ordered \par Weight loss discussed\par I stressed the need maintain compliance  with the PAP device.\par The patient is not to use an Ozone or UV sterilizer. \par F/U in 3 months\par \par Assessment:\par COPD  \par plan:\par Stress compliance with inhalers. Renewed today.\par cont PRN albuterol\par needs PFT\par weight loss\par CT chest now\par F/U 6 months\par

## 2022-12-13 NOTE — ED ADULT TRIAGE NOTE - TEMPERATURE IN FAHRENHEIT (DEGREES F)
Retinal tear and detachment warning symptoms reviewed and patient instructed to call immediately if increasing floaters, flashes, or decreasing peripheral vision. 99.1

## 2023-01-14 ENCOUNTER — OUTPATIENT (OUTPATIENT)
Dept: OUTPATIENT SERVICES | Facility: HOSPITAL | Age: 63
LOS: 1 days | Discharge: HOME | End: 2023-01-14
Payer: COMMERCIAL

## 2023-01-14 ENCOUNTER — APPOINTMENT (OUTPATIENT)
Dept: SLEEP CENTER | Facility: HOSPITAL | Age: 63
End: 2023-01-14

## 2023-01-14 DIAGNOSIS — Z98.890 OTHER SPECIFIED POSTPROCEDURAL STATES: Chronic | ICD-10-CM

## 2023-01-14 DIAGNOSIS — I25.10 ATHEROSCLEROTIC HEART DISEASE OF NATIVE CORONARY ARTERY WITHOUT ANGINA PECTORIS: Chronic | ICD-10-CM

## 2023-01-14 PROCEDURE — 95810 POLYSOM 6/> YRS 4/> PARAM: CPT | Mod: 26

## 2023-01-17 DIAGNOSIS — G47.33 OBSTRUCTIVE SLEEP APNEA (ADULT) (PEDIATRIC): ICD-10-CM

## 2023-10-13 ENCOUNTER — EMERGENCY (EMERGENCY)
Facility: HOSPITAL | Age: 63
LOS: 0 days | Discharge: HOME | End: 2023-10-13
Attending: EMERGENCY MEDICINE
Payer: COMMERCIAL

## 2023-10-13 VITALS
RESPIRATION RATE: 20 BRPM | SYSTOLIC BLOOD PRESSURE: 136 MMHG | TEMPERATURE: 99 F | DIASTOLIC BLOOD PRESSURE: 83 MMHG | WEIGHT: 246.92 LBS | OXYGEN SATURATION: 95 % | HEART RATE: 76 BPM | HEIGHT: 69 IN

## 2023-10-13 DIAGNOSIS — Z98.890 OTHER SPECIFIED POSTPROCEDURAL STATES: Chronic | ICD-10-CM

## 2023-10-13 DIAGNOSIS — Z20.822 CONTACT WITH AND (SUSPECTED) EXPOSURE TO COVID-19: ICD-10-CM

## 2023-10-13 DIAGNOSIS — Z87.891 PERSONAL HISTORY OF NICOTINE DEPENDENCE: ICD-10-CM

## 2023-10-13 DIAGNOSIS — E78.5 HYPERLIPIDEMIA, UNSPECIFIED: ICD-10-CM

## 2023-10-13 DIAGNOSIS — Z98.890 OTHER SPECIFIED POSTPROCEDURAL STATES: ICD-10-CM

## 2023-10-13 DIAGNOSIS — Z95.5 PRESENCE OF CORONARY ANGIOPLASTY IMPLANT AND GRAFT: ICD-10-CM

## 2023-10-13 DIAGNOSIS — J44.1 CHRONIC OBSTRUCTIVE PULMONARY DISEASE WITH (ACUTE) EXACERBATION: ICD-10-CM

## 2023-10-13 DIAGNOSIS — R05.9 COUGH, UNSPECIFIED: ICD-10-CM

## 2023-10-13 DIAGNOSIS — I25.10 ATHEROSCLEROTIC HEART DISEASE OF NATIVE CORONARY ARTERY WITHOUT ANGINA PECTORIS: ICD-10-CM

## 2023-10-13 DIAGNOSIS — R06.2 WHEEZING: ICD-10-CM

## 2023-10-13 DIAGNOSIS — Z79.82 LONG TERM (CURRENT) USE OF ASPIRIN: ICD-10-CM

## 2023-10-13 DIAGNOSIS — I25.10 ATHEROSCLEROTIC HEART DISEASE OF NATIVE CORONARY ARTERY WITHOUT ANGINA PECTORIS: Chronic | ICD-10-CM

## 2023-10-13 DIAGNOSIS — Z79.02 LONG TERM (CURRENT) USE OF ANTITHROMBOTICS/ANTIPLATELETS: ICD-10-CM

## 2023-10-13 DIAGNOSIS — I10 ESSENTIAL (PRIMARY) HYPERTENSION: ICD-10-CM

## 2023-10-13 LAB
FLUAV AG NPH QL: SIGNIFICANT CHANGE UP
FLUBV AG NPH QL: SIGNIFICANT CHANGE UP
RSV RNA NPH QL NAA+NON-PROBE: SIGNIFICANT CHANGE UP
SARS-COV-2 RNA SPEC QL NAA+PROBE: SIGNIFICANT CHANGE UP

## 2023-10-13 PROCEDURE — 93010 ELECTROCARDIOGRAM REPORT: CPT

## 2023-10-13 PROCEDURE — 99285 EMERGENCY DEPT VISIT HI MDM: CPT

## 2023-10-13 PROCEDURE — 71046 X-RAY EXAM CHEST 2 VIEWS: CPT | Mod: 26

## 2023-10-13 PROCEDURE — 0241U: CPT

## 2023-10-13 PROCEDURE — 94640 AIRWAY INHALATION TREATMENT: CPT

## 2023-10-13 PROCEDURE — 99285 EMERGENCY DEPT VISIT HI MDM: CPT | Mod: 25

## 2023-10-13 PROCEDURE — 93005 ELECTROCARDIOGRAM TRACING: CPT

## 2023-10-13 PROCEDURE — 71046 X-RAY EXAM CHEST 2 VIEWS: CPT

## 2023-10-13 RX ORDER — ALBUTEROL 90 UG/1
2.5 AEROSOL, METERED ORAL ONCE
Refills: 0 | Status: COMPLETED | OUTPATIENT
Start: 2023-10-13 | End: 2023-10-13

## 2023-10-13 RX ORDER — ALBUTEROL 90 UG/1
1 AEROSOL, METERED ORAL
Qty: 40 | Refills: 0
Start: 2023-10-13 | End: 2023-10-17

## 2023-10-13 RX ORDER — IPRATROPIUM/ALBUTEROL SULFATE 18-103MCG
3 AEROSOL WITH ADAPTER (GRAM) INHALATION ONCE
Refills: 0 | Status: COMPLETED | OUTPATIENT
Start: 2023-10-13 | End: 2023-10-13

## 2023-10-13 RX ORDER — ALBUTEROL 90 UG/1
2 AEROSOL, METERED ORAL
Qty: 1 | Refills: 0
Start: 2023-10-13 | End: 2023-10-17

## 2023-10-13 RX ADMIN — ALBUTEROL 2.5 MILLIGRAM(S): 90 AEROSOL, METERED ORAL at 12:59

## 2023-10-13 RX ADMIN — ALBUTEROL 2.5 MILLIGRAM(S): 90 AEROSOL, METERED ORAL at 15:59

## 2023-10-13 RX ADMIN — ALBUTEROL 2.5 MILLIGRAM(S): 90 AEROSOL, METERED ORAL at 15:25

## 2023-10-13 RX ADMIN — Medication 3 MILLILITER(S): at 12:59

## 2023-10-13 RX ADMIN — Medication 3 MILLILITER(S): at 14:18

## 2023-10-13 RX ADMIN — Medication 60 MILLIGRAM(S): at 12:59

## 2023-10-13 NOTE — ED ADULT NURSE NOTE - NSICDXPASTSURGICALHX_GEN_ALL_CORE_FT
PAST SURGICAL HISTORY:  Coronary artery disease s/p PCI x1    History of umbilical hernia repair

## 2023-10-13 NOTE — ED PROVIDER NOTE - OBJECTIVE STATEMENT
Patient c/o cough, wheezing, COPD , no fever, no chest pain, Sx p ast 2 weeks, was on short course prednisone which help, But sx returned past 2 days,

## 2023-10-13 NOTE — ED ADULT NURSE NOTE - OBJECTIVE STATEMENT
HISTORY OF PRESENT ILLNESS  Nida Primrose is a 81 y.o. female. HPI Comments: Nida Primrose is here for ER follow up after being seen for a UTI on the 4th. Also, she was given IVF for dehydration. She was treated with Keflex, however, her urine culture only showed mixed urogenital elaine. Today she is doing a lot better and feels almost back to baseline. The IVF helped significantly. Review of Systems   Constitutional: Negative for chills, fever and malaise/fatigue. Respiratory: Negative for shortness of breath. Cardiovascular: Negative for chest pain. Gastrointestinal: Negative for abdominal pain. Genitourinary: Negative for dysuria, flank pain, frequency, hematuria and urgency. Visit Vitals    /55    Pulse 71    Temp 98.3 °F (36.8 °C) (Oral)    Resp 18    Ht 5' 1\" (1.549 m)    Wt 113 lb (51.3 kg)    BMI 21.35 kg/m2     Physical Exam   Constitutional: She is oriented to person, place, and time. She appears well-developed and well-nourished. No distress. Cardiovascular: Normal rate, regular rhythm and normal heart sounds. Exam reveals no gallop and no friction rub. No murmur heard. Pulmonary/Chest: Effort normal and breath sounds normal. No respiratory distress. She has no wheezes. She has no rales. Abdominal: Soft. Normal appearance and bowel sounds are normal. She exhibits no distension. There is no hepatosplenomegaly. There is no tenderness. There is no rebound and no guarding. Neurological: She is alert and oriented to person, place, and time. Skin: Skin is warm and dry. She is not diaphoretic. Nursing note and vitals reviewed. ASSESSMENT and PLAN    ICD-10-CM ICD-9-CM    1. Dehydration E86.0 276.51         Patient doing extremely well  Encouraged to increase fluid intake    Follow-up Disposition:  Return if symptoms worsen or fail to improve. Reviewed plan of care. Patient has provided input and agrees with goals. short of breath with cough and wheeze

## 2023-10-13 NOTE — ED PROVIDER NOTE - NS ED ATTENDING STATEMENT MOD
[FreeTextEntry1] : wt reduction encouraged
This was a shared visit with the ANGELES. I reviewed and verified the documentation and independently performed the documented:

## 2023-10-13 NOTE — ED ADULT TRIAGE NOTE - CHIEF COMPLAINT QUOTE
PT complains of intermittent chest tightness and sob x 2 weeks. Patient was seen at [facility] and prescribed prednisone and antibiotics and improved

## 2023-10-13 NOTE — ED PROVIDER NOTE - CLINICAL SUMMARY MEDICAL DECISION MAKING FREE TEXT BOX
patient presents for evaluation of cough cold and congestion on physical exam is done respiratory distress but does have diffuse wheezing given albuterol treatments we started p.o. prednisone here we obtain EKG per my depend evaluation not consistent with STEMI QT prolongation or arrhythmia in addition chest x-ray per my depend evaluation is not consistent with pneumothorax or pneumonia patient improved here in the emergency department although he does complain of cough cold congestion this is not a presentation of ACS given EKG and physical exam findings of wheezing most likely related to worsening exacerbation of COPD most likely secondary to infection most likely viral

## 2023-10-13 NOTE — ED PROVIDER NOTE - NSFOLLOWUPINSTRUCTIONS_ED_ALL_ED_FT
follow up with your PMD this week and pulmonary MD. Take meds as prescibed, return to ED if your symptoms worsen     Our Emergency Department Referral Coordinators will be reaching out to you in the next 24-48 hours from 9:00am to 5:00pm with a follow up appointment. Please expect a phone call from the hospital in that time frame. If you do not receive a call or if you have any questions or concerns, you can reach them at [phone] or [phone].

## 2023-10-13 NOTE — ED PROVIDER NOTE - PATIENT PORTAL LINK FT
You can access the FollowMyHealth Patient Portal offered by [facility] by registering at the following website: [email or web address]. By joining [employer]’s FollowMyHealth portal, you will also be able to view your health information using other applications (apps) compatible with our system.

## 2023-10-13 NOTE — ED ADULT NURSE NOTE - CHIEF COMPLAINT QUOTE
PT complains of intermittent chest tightness and sob x 2 weeks. Patient was seen at [facility] and prescribed prednisone and antibiotics and improved

## 2023-10-13 NOTE — ED PROVIDER NOTE - ATTENDING APP SHARED VISIT CONTRIBUTION OF CARE
I have personally performed a history and physical exam on this patient and personally directed the management of the patient. Patient is a 63-year-old male complaining of cough cold and wheezing onset of the past 10 days 48 hours after developing symptoms patient presented to urgent care was placed on a course of p.o. antibiotics that he is unable to identify but completed the course in edition also started on p.o. steroids states that he improved but after stopping the course symptoms worsen denies any chest pain fevers chills vomiting diaphoresis denies any upper back pain lower back pain patient has a history of COPD quit smoking approximately 5 years prior    On physical exam patient is normocephalic atraumatic pupils equally round and reactive light accommodation extraocular muscles intact oropharynx clear chest reveals diffuse wheezing bilaterally abdomen soft nontender nondistended bowel sounds positive no guarding or rebound extremities full range of motion patient able to ambulate radial pulse 2+ pedal pulse 2+    Assessment plan patient presents for evaluation of cough cold and congestion on physical exam is done respiratory distress but does have diffuse wheezing given albuterol treatments we started p.o. prednisone here we obtain EKG per my depend evaluation not consistent with STEMI QT prolongation or arrhythmia in addition chest x-ray per my depend evaluation is not consistent with pneumothorax or pneumonia patient improved here in the emergency department although he does complain of cough cold congestion this is not a presentation of ACS given EKG and physical exam findings of wheezing most likely related to worsening exacerbation of COPD most likely secondary to infection most likely viral

## 2023-11-21 NOTE — ED ADULT NURSE NOTE - CHIEF COMPLAINT
Kip Dominguez ([id number]) initiated an asynchronous digital communication through [address] [place] [address]. HPI: per patient questionnaire     Exam: not applicable    Diagnoses and all orders for this visit:  Diagnoses and all orders for this visit:    Acute tonsillitis, unspecified etiology    Other orders  -     amoxicillin (AMOXIL) 500 MG capsule; Take 1 capsule by mouth 2 times daily for 10 days    COVID-negative. Concern for tonsillitis. Antibiotics sent due to persistent symptoms for 6 days. Supportive care measures provided. Follow-up PCP as needed      Time: EV2 - 11-20 minutes were spent on the digital evaluation and management of this patient.  15 min     OLIVE Corley - CNP The patient is a 60y Male complaining of foot pain/injury.

## 2023-12-06 PROBLEM — Z87.891 FORMER SMOKER: Status: ACTIVE | Noted: 2018-05-10

## 2023-12-06 PROBLEM — I73.9 PVD (PERIPHERAL VASCULAR DISEASE): Status: ACTIVE | Noted: 2018-05-10

## 2023-12-07 ENCOUNTER — TRANSCRIPTION ENCOUNTER (OUTPATIENT)
Age: 63
End: 2023-12-07

## 2023-12-07 ENCOUNTER — APPOINTMENT (OUTPATIENT)
Dept: CARDIOLOGY | Facility: CLINIC | Age: 63
End: 2023-12-07
Payer: COMMERCIAL

## 2023-12-07 ENCOUNTER — RESULT CHARGE (OUTPATIENT)
Age: 63
End: 2023-12-07

## 2023-12-07 VITALS — HEIGHT: 69 IN | WEIGHT: 265 LBS | TEMPERATURE: 97.6 F | BODY MASS INDEX: 39.25 KG/M2

## 2023-12-07 VITALS — SYSTOLIC BLOOD PRESSURE: 100 MMHG | DIASTOLIC BLOOD PRESSURE: 80 MMHG

## 2023-12-07 VITALS — DIASTOLIC BLOOD PRESSURE: 74 MMHG | SYSTOLIC BLOOD PRESSURE: 108 MMHG

## 2023-12-07 VITALS — HEART RATE: 66 BPM

## 2023-12-07 DIAGNOSIS — Z87.891 PERSONAL HISTORY OF NICOTINE DEPENDENCE: ICD-10-CM

## 2023-12-07 DIAGNOSIS — I73.9 PERIPHERAL VASCULAR DISEASE, UNSPECIFIED: ICD-10-CM

## 2023-12-07 DIAGNOSIS — R07.89 OTHER CHEST PAIN: ICD-10-CM

## 2023-12-07 PROCEDURE — 99204 OFFICE O/P NEW MOD 45 MIN: CPT | Mod: 25

## 2023-12-07 PROCEDURE — 93000 ELECTROCARDIOGRAM COMPLETE: CPT

## 2023-12-07 RX ORDER — ALBUTEROL SULFATE 90 UG/1
108 (90 BASE) AEROSOL, METERED RESPIRATORY (INHALATION)
Refills: 0 | Status: ACTIVE | COMMUNITY

## 2023-12-07 RX ORDER — TICAGRELOR 90 MG/1
90 TABLET ORAL
Refills: 0 | Status: COMPLETED | COMMUNITY
End: 2023-12-07

## 2023-12-07 RX ORDER — EZETIMIBE 10 MG/1
10 TABLET ORAL
Refills: 0 | Status: ACTIVE | COMMUNITY

## 2023-12-07 RX ORDER — FENOFIBRATE 160 MG/1
160 TABLET ORAL
Refills: 0 | Status: COMPLETED | COMMUNITY
End: 2023-12-07

## 2023-12-19 ENCOUNTER — APPOINTMENT (OUTPATIENT)
Dept: PULMONOLOGY | Facility: CLINIC | Age: 63
End: 2023-12-19
Payer: COMMERCIAL

## 2023-12-19 VITALS
OXYGEN SATURATION: 98 % | WEIGHT: 269 LBS | HEIGHT: 69 IN | SYSTOLIC BLOOD PRESSURE: 114 MMHG | HEART RATE: 127 BPM | BODY MASS INDEX: 39.84 KG/M2 | DIASTOLIC BLOOD PRESSURE: 88 MMHG

## 2023-12-19 DIAGNOSIS — Z91.018 ALLERGY TO OTHER FOODS: ICD-10-CM

## 2023-12-19 PROCEDURE — 99214 OFFICE O/P EST MOD 30 MIN: CPT

## 2023-12-19 NOTE — ASSESSMENT
[FreeTextEntry1] : The patient appears to have a tree nut allergy. Will arrange for RAST testing of the same He should avoid all tree nuts. Will arrange for a full pulmonary function testing. He has albuterol that he would use on a as needed basis. We will follow-up in several months for ongoing management

## 2023-12-19 NOTE — REASON FOR VISIT
[Acute] : an acute visit [Shortness of Breath] : shortness of breath [TextBox_44] : Recently had episode of shortness of breath which developed after he ate few cashews.  He stated he felt himself wheezing and shortness of breath.  Couple hours later it wears off.  He had another instance after he was using almond flour when he would eat a product made with the almond flour he was getting get wheezing and shortness of breath.  When he avoided these agents he did not have any problems.  As a younger man he does not remember getting problems eating any type of products.

## 2023-12-19 NOTE — PHYSICAL EXAM
[No Acute Distress] : no acute distress [Normal Oropharynx] : normal oropharynx [Normal Appearance] : normal appearance [No Neck Mass] : no neck mass [Normal Rate/Rhythm] : normal rate/rhythm [Normal S1, S2] : normal s1, s2 [No Murmurs] : no murmurs [No Resp Distress] : no resp distress [Clear to Auscultation Bilaterally] : clear to auscultation bilaterally [No Abnormalities] : no abnormalities [Benign] : benign [Normal Gait] : normal gait [No Clubbing] : no clubbing [No Cyanosis] : no cyanosis [No Edema] : no edema [FROM] : FROM [Normal Color/ Pigmentation] : normal color/ pigmentation [No Focal Deficits] : no focal deficits [Oriented x3] : oriented x3 [Normal Affect] : normal affect [TextBox_68] : Harsh breath sounds

## 2024-01-24 ENCOUNTER — APPOINTMENT (OUTPATIENT)
Dept: CARDIOLOGY | Facility: CLINIC | Age: 64
End: 2024-01-24
Payer: COMMERCIAL

## 2024-01-24 PROCEDURE — 93320 DOPPLER ECHO COMPLETE: CPT

## 2024-01-24 PROCEDURE — 93325 DOPPLER ECHO COLOR FLOW MAPG: CPT

## 2024-01-24 PROCEDURE — 93351 STRESS TTE COMPLETE: CPT

## 2024-01-24 NOTE — REVIEW OF SYSTEMS
[SOB] : shortness of breath [Chest Discomfort] : chest discomfort [Wheezing] : wheezing [Negative] : Psychiatric

## 2024-01-24 NOTE — HISTORY OF PRESENT ILLNESS
[FreeTextEntry1] : Mr. Moses is a 62yo M with PMHx of CAD s/p PCI (2018, [facility]), HTN, HLD, COPD, SOBEIDA (not using CPAP) who presents to [facility] care. His PMD is Dr. Ty Metz. Patient has been having chest pain (sharp in different locations) and SOB for about a month. He followed with pulmonology and was told that he has mild emphysema and was recommended to see a cardiologist. He also reports wheezing. These symptoms occur with exertion and at rest. The patient complains of fatigue which doesn't improve after sleep. Last visit with cardiology was a year ago with . Denies palpitations. The patient had sleep studies in 01/2023 and was diagnosed with mild SOBEIDA.  01/25/2024: Patient was hypotensive yesterday when presented for stress ECHO BP 89/56, big discrepancy with his hand watch device showing /80. Stress ECHO was not diagnostic for ischemia as patient didn't achieve target HR.

## 2024-01-24 NOTE — REASON FOR VISIT
[FreeTextEntry1] : Diagnostic Tests: ----------------------- EK23-NSR. Incomplete RBBB.  10/13/23-NSR. PRWP.  ----------------------- US:  05/10/18-LE arterial: Negative for significant stenosis.  [Other: ____] : [unfilled]

## 2024-01-24 NOTE — PHYSICAL EXAM
[Well Developed] : well developed [No Acute Distress] : no acute distress [Well Nourished] : well nourished [Normal Conjunctiva] : normal conjunctiva [Obese] : obese [Normal Venous Pressure] : normal venous pressure [No Carotid Bruit] : no carotid bruit [5th Left ICS - MCL] : palpated at the 5th LICS in the midclavicular line [Normal] : normal [Normal Rate] : normal [No Precordial Heave] : no precordial heave was noted [Normal S1] : normal S1 [Rhythm Regular] : regular [Normal S2] : normal S2 [No Murmur] : no murmurs heard [No Pitting Edema] : no pitting edema present [2+] : left 2+ [No Respiratory Distress] : no respiratory distress  [Soft] : abdomen soft [Wheeze ____] : wheeze [unfilled] [Non Tender] : non-tender [Normal Bowel Sounds] : normal bowel sounds [No Masses/organomegaly] : no masses/organomegaly [Normal Gait] : normal gait [No Edema] : no edema [No Cyanosis] : no cyanosis [No Rash] : no rash [No Clubbing] : no clubbing [No Varicosities] : no varicosities [Moves all extremities] : moves all extremities [No Skin Lesions] : no skin lesions [No Focal Deficits] : no focal deficits [Normal Speech] : normal speech [Alert and Oriented] : alert and oriented [Normal memory] : normal memory

## 2024-01-24 NOTE — ASSESSMENT
[FreeTextEntry1] : Chest discomfort, SOB - will obtain stress ECHO to r/o ischemia, functional/structural abnormalities.   Hyperlipidemia: HLD: , , HDL 57, LDL 96 (01/23) - weight loss - encouraged patient to follow healthy exercise and eating habits, focusing on Mediterranean style of eating and aiming for the recommended 150 minutes per week of moderate physical activity. - continue Pravastatin for now, based on blood work results will consider switching to high intensity statin.   Coronary artery disease: - will continue Aspirin, Metoprolol, statin.   - follow with pulmonology, evaluate SOB, wheezing - blood work   F/U in 3 months

## 2024-01-25 ENCOUNTER — APPOINTMENT (OUTPATIENT)
Dept: CARDIOLOGY | Facility: CLINIC | Age: 64
End: 2024-01-25
Payer: COMMERCIAL

## 2024-01-25 VITALS — WEIGHT: 268 LBS | TEMPERATURE: 97.6 F | HEIGHT: 69 IN | BODY MASS INDEX: 39.69 KG/M2

## 2024-01-25 VITALS — DIASTOLIC BLOOD PRESSURE: 68 MMHG | SYSTOLIC BLOOD PRESSURE: 116 MMHG

## 2024-01-25 VITALS — SYSTOLIC BLOOD PRESSURE: 120 MMHG | HEART RATE: 68 BPM | DIASTOLIC BLOOD PRESSURE: 80 MMHG

## 2024-01-25 PROCEDURE — 99214 OFFICE O/P EST MOD 30 MIN: CPT | Mod: 25

## 2024-01-25 PROCEDURE — 93000 ELECTROCARDIOGRAM COMPLETE: CPT

## 2024-01-25 RX ORDER — FENOFIBRATE 48 MG/1
48 TABLET ORAL DAILY
Qty: 90 | Refills: 3 | Status: ACTIVE | COMMUNITY
Start: 2024-01-25 | End: 1900-01-01

## 2024-01-29 RX ORDER — DOXAZOSIN 4 MG/1
4 TABLET ORAL DAILY
Qty: 90 | Refills: 3 | Status: ACTIVE | COMMUNITY
Start: 1900-01-01 | End: 1900-01-01

## 2024-01-30 VITALS — OXYGEN SATURATION: 96 % | HEART RATE: 73 BPM | DIASTOLIC BLOOD PRESSURE: 78 MMHG | SYSTOLIC BLOOD PRESSURE: 115 MMHG

## 2024-01-30 NOTE — H&P CARDIOLOGY - GASTROINTESTINAL
Soft, non-tender, no hepatosplenomegaly, normal bowel sounds
Shaken Baby Prevention Handout/Guide to Postpartum Care/[facility] Hearing Screen Program/[facility] [place] Screening Program/Discharge Medication Information for Patients and Families Pocket Guide/Breastfeeding Log/Vaccinations

## 2024-01-30 NOTE — H&P CARDIOLOGY - HISTORY OF PRESENT ILLNESS
Patient is a 63y Male with PMH of CAD s/p PCI 2018 to Ramus, HTN,, HLD, COPD, SOBEIDA                                                                       PSH of HERNIA REPAIR                                                                       Family history:  CAD-brother and sibling    Patient has been having symptoms of......  Had stress echo 24 and presents to the cardiology department for [facility] with possible intervention.     Stress echo 24  CONCLUSIONS  1.Abnormal exercise stress echocardiogram.  2.The electrocardiogram is non-diagnostic due to inadequate percent of maximum predicted heart rate   achieved however, no ischemic changes seen at 74 % of maximum heart rate achieved.  3.No echocardiographic evidence of exercise induced ischemia.  4.The patient underwent stress testing using the Standard Ishan protocol.  • The patient exercised for 6 min 15 sec. Test was stopped due to dyspnea, generalized fatigue and   patient request.  • The peak heart rate was 117 bpm: 74 % of predicted maximal heart rate for this patient.  • The patient achieved 7.4 METs which is consistent with average exercise capacity.  5.Negative for angina or the patient's characteristic chest pain.  6.Did not achieve target heart rate.  7.Heart rate might be attenuated by Metoprolol.  8.Physiologic blood pressure response to exercise.  9.No significant exercise-induced arrhythmias.  10.The Duke Treadmill Score is 6.00, which is consistent with low risk (=5) for future cardiac events.  11.Left ventricular systolic function is normal with an ejection fraction of 60 % by Elizalde's method of   disks.  12.Normal left ventricular diastolic function.  13.Mildly enlarged right ventricular cavity size, wall thickness, and normal systolic function. Tricuspid   annular plane systolic excursion (TAPSE) is 2.2 cm (normal >=1.7 cm).  14.Normal left and right atrial size.  15.No significant valvular disease.  16.Pulmonary artery systolic pressure could not be estimated.  17.No pericardial effusion seen      Pre cath note:  indication:  [ ] STEMI                [ ] NSTEMI                 [ ] Acute coronary syndrome                   [ ]Unstable Angina   [ ] high risk  [ ] intermediate risk  [ ] low risk                   [ x] Stable Angina     non-invasive testing:   stress echo                       Date:  24                   result: [ ] high risk  [ ] intermediate risk  [ ] low risk    Anti- Anginal medications:                    [ ] not used                       [ ] used                   [ ] not used but strong indication not to use    Ejection Fraction                   [ ] <29            [ ] 30-39%   [ ] 40-49%     [ ]>50%    CHF                   [ ] active (within last 14 days on meds   [ ] Chronic (on meds but no exacerbation)    COPD                   [x ] mild (on chronic bronchodilators)  [ ] moderate (on chronic steroid therapy)      [ ] severe (indication for home O2 or PACO2 >50)    Other risk factors:                     [ ] Previous MI                     [ ] CVA/ stroke                    [ ] carotid stent/ CEA                    [ ] PVD/PAD- (arterial aneurysm, non-palpable pulses, tortuous vessel with inability to insert catheter, infra-renal dissection, renal or subclavian artery stenosis)                    [ ] diabetic                    [ ] previous CABG                    [ ] Renal Failure     Bleeding Risk:       RIGHT RADIAL ARTERY EVALUATION:  TONO TEST: [] Negative          [] Positive  BARBEAU TEST: [] Class A           [] Class B           [] Class C            [] Class D      EF:     EK24 SR with RBBB    Fluids:  Patient is a 63y Male with PMH of CAD s/p PCI 2018 to Ramus, HTN,, HLD, COPD, SOBEIDA                                                                       PSH of HERNIA REPAIR                                                                       Family history:  CAD-brother and sibling    Patient has been having symptoms of......  Had stress echo 24 and presents to the cardiology department for [facility] with possible intervention.     Cardiac Cath PCI   2018: Ramus 3.5mm x 12mm Synergy    Stress echo 24  CONCLUSIONS  1.Abnormal exercise stress echocardiogram.  2.The electrocardiogram is non-diagnostic due to inadequate percent of maximum predicted heart rate   achieved however, no ischemic changes seen at 74 % of maximum heart rate achieved.  3.No echocardiographic evidence of exercise induced ischemia.  4.The patient underwent stress testing using the Standard Ishan protocol.  • The patient exercised for 6 min 15 sec. Test was stopped due to dyspnea, generalized fatigue and   patient request.  • The peak heart rate was 117 bpm: 74 % of predicted maximal heart rate for this patient.  • The patient achieved 7.4 METs which is consistent with average exercise capacity.  5.Negative for angina or the patient's characteristic chest pain.  6.Did not achieve target heart rate.  7.Heart rate might be attenuated by Metoprolol.  8.Physiologic blood pressure response to exercise.  9.No significant exercise-induced arrhythmias.  10.The Duke Treadmill Score is 6.00, which is consistent with low risk (=5) for future cardiac events.  11.Left ventricular systolic function is normal with an ejection fraction of 60 % by Elizalde's method of   disks.  12.Normal left ventricular diastolic function.  13.Mildly enlarged right ventricular cavity size, wall thickness, and normal systolic function. Tricuspid   annular plane systolic excursion (TAPSE) is 2.2 cm (normal >=1.7 cm).  14.Normal left and right atrial size.  15.No significant valvular disease.  16.Pulmonary artery systolic pressure could not be estimated.  17.No pericardial effusion seen      Pre cath note:  indication:  [ ] STEMI                [ ] NSTEMI                 [ ] Acute coronary syndrome                   [ ]Unstable Angina   [ ] high risk  [ ] intermediate risk  [ ] low risk                   [ x] Stable Angina     non-invasive testing:   stress echo                       Date:  24                   result: [ ] high risk  [ ] intermediate risk  [ ] low risk    Anti- Anginal medications:                    [ ] not used                       [x ] used                   [ ] not used but strong indication not to use  -bb  -nitrate    Ejection Fraction                   [ ] <29            [ ] 30-39%   [ ] 40-49%     [x ]>50%    CHF                   [ ] active (within last 14 days on meds   [ ] Chronic (on meds but no exacerbation)    COPD                   [x ] mild (on chronic bronchodilators)  [ ] moderate (on chronic steroid therapy)      [ ] severe (indication for home O2 or PACO2 >50)    Other risk factors:                     [ ] Previous MI                     [ ] CVA/ stroke                    [ ] carotid stent/ CEA                    [ ] PVD/PAD- (arterial aneurysm, non-palpable pulses, tortuous vessel with inability to insert catheter, infra-renal dissection, renal or subclavian artery stenosis)                    [ ] diabetic                    [ ] previous CABG                    [ ] Renal Failure     Bleeding Risk: 0.6%      RIGHT RADIAL ARTERY EVALUATION:  TONO TEST: [] Negative          [] Positive  BARBEAU TEST: [] Class A           [] Class B           [] Class C            [] Class D      EF: 60% stress echo 24    EK24 SR with RBBB    Fluids:  Patient is a 63y Male with PMH of CAD h/o NSTEMI with PCI 2018 to Ramus, HTN,, HLD, COPD, SOBEIDA (no longer needs CPAP), + family h/o CAD (father, sibling). He presented to cardiologist with c/o CP and shortness of breath with exertion x 1month, also with low BP.  Stress echo 24  (see below).   Patient presents today for [facility] with possible intervention.     Cardiac Cath PCI   2018: Ramus 3.5mm x 12mm Synergy    Stress echo 24  CONCLUSIONS  1.Abnormal exercise stress echocardiogram.  2.The electrocardiogram is non-diagnostic due to inadequate percent of maximum predicted heart rate   achieved however, no ischemic changes seen at 74 % of maximum heart rate achieved.  3.No echocardiographic evidence of exercise induced ischemia.  4.The patient underwent stress testing using the Standard Ishan protocol.  • The patient exercised for 6 min 15 sec. Test was stopped due to dyspnea, generalized fatigue and   patient request.  • The peak heart rate was 117 bpm: 74 % of predicted maximal heart rate for this patient.  • The patient achieved 7.4 METs which is consistent with average exercise capacity.  5.Negative for angina or the patient's characteristic chest pain.  6.Did not achieve target heart rate.  7.Heart rate might be attenuated by Metoprolol.  8.Physiologic blood pressure response to exercise.  9.No significant exercise-induced arrhythmias.  10.The Duke Treadmill Score is 6.00, which is consistent with low risk (=5) for future cardiac events.  11.Left ventricular systolic function is normal with an ejection fraction of 60 % by Elizalde's method of   disks.  12.Normal left ventricular diastolic function.  13.Mildly enlarged right ventricular cavity size, wall thickness, and normal systolic function. Tricuspid   annular plane systolic excursion (TAPSE) is 2.2 cm (normal >=1.7 cm).  14.Normal left and right atrial size.  15.No significant valvular disease.  16.Pulmonary artery systolic pressure could not be estimated.  17.No pericardial effusion seen      Pre cath note:  indication:  [ ] STEMI                [ ] NSTEMI                 [ ] Acute coronary syndrome                   [ ]Unstable Angina   [ ] high risk  [ ] intermediate risk  [ ] low risk                   [ x] Stable Angina     non-invasive testing:   stress echo                       Date:  24                   result: [ ] high risk  [ ] intermediate risk  [ ] low risk    Anti- Anginal medications:                    [ ] not used                       [x ] used                   [ ] not used but strong indication not to use  -bb  -nitrate    Ejection Fraction                   [ ] <29            [ ] 30-39%   [ ] 40-49%     [x ]>50%    CHF                   [ ] active (within last 14 days on meds   [ ] Chronic (on meds but no exacerbation)    COPD                   [x ] mild (on chronic bronchodilators)  [ ] moderate (on chronic steroid therapy)      [ ] severe (indication for home O2 or PACO2 >50)    Other risk factors:                     [ ] Previous MI                     [ ] CVA/ stroke                    [ ] carotid stent/ CEA                    [ ] PVD/PAD- (arterial aneurysm, non-palpable pulses, tortuous vessel with inability to insert catheter, infra-renal dissection, renal or subclavian artery stenosis)                    [ ] diabetic                    [ ] previous CABG                    [ ] Renal Failure     Bleeding Risk: 0.6%      RIGHT RADIAL ARTERY EVALUATION:  TONO TEST: positive      EF: 60% stress echo 24    EK24 SR with RBBB    Fluids:  ml bolus then 75ml/hr until cath

## 2024-02-01 ENCOUNTER — OUTPATIENT (OUTPATIENT)
Dept: OUTPATIENT SERVICES | Facility: HOSPITAL | Age: 64
LOS: 1 days | Discharge: HOME | End: 2024-02-01
Payer: COMMERCIAL

## 2024-02-01 ENCOUNTER — TRANSCRIPTION ENCOUNTER (OUTPATIENT)
Age: 64
End: 2024-02-01

## 2024-02-01 VITALS
HEART RATE: 58 BPM | DIASTOLIC BLOOD PRESSURE: 66 MMHG | SYSTOLIC BLOOD PRESSURE: 107 MMHG | OXYGEN SATURATION: 99 % | RESPIRATION RATE: 15 BRPM

## 2024-02-01 DIAGNOSIS — I25.118 ATHEROSCLEROTIC HEART DISEASE OF NATIVE CORONARY ARTERY WITH OTHER FORMS OF ANGINA PECTORIS: ICD-10-CM

## 2024-02-01 DIAGNOSIS — R94.39 ABNORMAL RESULT OF OTHER CARDIOVASCULAR FUNCTION STUDY: ICD-10-CM

## 2024-02-01 DIAGNOSIS — I10 ESSENTIAL (PRIMARY) HYPERTENSION: ICD-10-CM

## 2024-02-01 DIAGNOSIS — I25.10 ATHEROSCLEROTIC HEART DISEASE OF NATIVE CORONARY ARTERY WITHOUT ANGINA PECTORIS: Chronic | ICD-10-CM

## 2024-02-01 DIAGNOSIS — Z98.890 OTHER SPECIFIED POSTPROCEDURAL STATES: Chronic | ICD-10-CM

## 2024-02-01 DIAGNOSIS — Z95.5 PRESENCE OF CORONARY ANGIOPLASTY IMPLANT AND GRAFT: ICD-10-CM

## 2024-02-01 DIAGNOSIS — E78.5 HYPERLIPIDEMIA, UNSPECIFIED: ICD-10-CM

## 2024-02-01 DIAGNOSIS — I25.10 ATHEROSCLEROTIC HEART DISEASE OF NATIVE CORONARY ARTERY WITHOUT ANGINA PECTORIS: ICD-10-CM

## 2024-02-01 LAB
ANION GAP SERPL CALC-SCNC: 10 MMOL/L — SIGNIFICANT CHANGE UP (ref 7–14)
BUN SERPL-MCNC: 24 MG/DL — HIGH (ref 10–20)
CALCIUM SERPL-MCNC: 9.3 MG/DL — SIGNIFICANT CHANGE UP (ref 8.4–10.5)
CHLORIDE SERPL-SCNC: 104 MMOL/L — SIGNIFICANT CHANGE UP (ref 98–110)
CO2 SERPL-SCNC: 29 MMOL/L — SIGNIFICANT CHANGE UP (ref 17–32)
CREAT SERPL-MCNC: 1.2 MG/DL — SIGNIFICANT CHANGE UP (ref 0.7–1.5)
EGFR: 68 ML/MIN/1.73M2 — SIGNIFICANT CHANGE UP
GLUCOSE SERPL-MCNC: 91 MG/DL — SIGNIFICANT CHANGE UP (ref 70–99)
HCT VFR BLD CALC: 35.4 % — LOW (ref 42–52)
HCT VFR BLD CALC: 38.9 % — LOW (ref 42–52)
HGB BLD-MCNC: 12.4 G/DL — LOW (ref 14–18)
HGB BLD-MCNC: 13.8 G/DL — LOW (ref 14–18)
MCHC RBC-ENTMCNC: 31 PG — SIGNIFICANT CHANGE UP (ref 27–31)
MCHC RBC-ENTMCNC: 31 PG — SIGNIFICANT CHANGE UP (ref 27–31)
MCHC RBC-ENTMCNC: 35 G/DL — SIGNIFICANT CHANGE UP (ref 32–37)
MCHC RBC-ENTMCNC: 35.5 G/DL — SIGNIFICANT CHANGE UP (ref 32–37)
MCV RBC AUTO: 87.4 FL — SIGNIFICANT CHANGE UP (ref 80–94)
MCV RBC AUTO: 88.5 FL — SIGNIFICANT CHANGE UP (ref 80–94)
NRBC # BLD: 0 /100 WBCS — SIGNIFICANT CHANGE UP (ref 0–0)
NRBC # BLD: 0 /100 WBCS — SIGNIFICANT CHANGE UP (ref 0–0)
PLATELET # BLD AUTO: 147 K/UL — SIGNIFICANT CHANGE UP (ref 130–400)
PLATELET # BLD AUTO: 179 K/UL — SIGNIFICANT CHANGE UP (ref 130–400)
PMV BLD: 9.5 FL — SIGNIFICANT CHANGE UP (ref 7.4–10.4)
PMV BLD: 9.5 FL — SIGNIFICANT CHANGE UP (ref 7.4–10.4)
POTASSIUM SERPL-MCNC: 3.8 MMOL/L — SIGNIFICANT CHANGE UP (ref 3.5–5)
POTASSIUM SERPL-SCNC: 3.8 MMOL/L — SIGNIFICANT CHANGE UP (ref 3.5–5)
RBC # BLD: 4 M/UL — LOW (ref 4.7–6.1)
RBC # BLD: 4.45 M/UL — LOW (ref 4.7–6.1)
RBC # FLD: 12.6 % — SIGNIFICANT CHANGE UP (ref 11.5–14.5)
RBC # FLD: 12.8 % — SIGNIFICANT CHANGE UP (ref 11.5–14.5)
SODIUM SERPL-SCNC: 143 MMOL/L — SIGNIFICANT CHANGE UP (ref 135–146)
WBC # BLD: 7.81 K/UL — SIGNIFICANT CHANGE UP (ref 4.8–10.8)
WBC # BLD: 8.4 K/UL — SIGNIFICANT CHANGE UP (ref 4.8–10.8)
WBC # FLD AUTO: 7.81 K/UL — SIGNIFICANT CHANGE UP (ref 4.8–10.8)
WBC # FLD AUTO: 8.4 K/UL — SIGNIFICANT CHANGE UP (ref 4.8–10.8)

## 2024-02-01 PROCEDURE — 92928 PRQ TCAT PLMT NTRAC ST 1 LES: CPT | Mod: LD

## 2024-02-01 PROCEDURE — C9600: CPT | Mod: LD

## 2024-02-01 PROCEDURE — C1887: CPT

## 2024-02-01 PROCEDURE — 85027 COMPLETE CBC AUTOMATED: CPT

## 2024-02-01 PROCEDURE — C1725: CPT

## 2024-02-01 PROCEDURE — 36415 COLL VENOUS BLD VENIPUNCTURE: CPT

## 2024-02-01 PROCEDURE — C1894: CPT

## 2024-02-01 PROCEDURE — 92978 ENDOLUMINL IVUS OCT C 1ST: CPT | Mod: 26,LD

## 2024-02-01 PROCEDURE — C1874: CPT

## 2024-02-01 PROCEDURE — 93458 L HRT ARTERY/VENTRICLE ANGIO: CPT | Mod: 26,XU

## 2024-02-01 PROCEDURE — 93571 IV DOP VEL&/PRESS C FLO 1ST: CPT | Mod: LD

## 2024-02-01 PROCEDURE — 92978 ENDOLUMINL IVUS OCT C 1ST: CPT | Mod: LD

## 2024-02-01 PROCEDURE — 93458 L HRT ARTERY/VENTRICLE ANGIO: CPT | Mod: 59

## 2024-02-01 PROCEDURE — 80048 BASIC METABOLIC PNL TOTAL CA: CPT

## 2024-02-01 PROCEDURE — 93571 IV DOP VEL&/PRESS C FLO 1ST: CPT | Mod: 26,LD

## 2024-02-01 PROCEDURE — C1753: CPT

## 2024-02-01 PROCEDURE — C1769: CPT

## 2024-02-01 RX ORDER — CHLORHEXIDINE GLUCONATE 213 G/1000ML
1 SOLUTION TOPICAL ONCE
Refills: 0 | Status: DISCONTINUED | OUTPATIENT
Start: 2024-02-01 | End: 2024-02-01

## 2024-02-01 RX ORDER — CLOPIDOGREL BISULFATE 75 MG/1
1 TABLET, FILM COATED ORAL
Qty: 30 | Refills: 0
Start: 2024-02-01 | End: 2024-03-01

## 2024-02-01 RX ORDER — ALLOPURINOL 300 MG
1 TABLET ORAL
Refills: 0 | DISCHARGE

## 2024-02-01 RX ORDER — DOXAZOSIN MESYLATE 4 MG
1 TABLET ORAL
Qty: 0 | Refills: 0 | DISCHARGE

## 2024-02-01 RX ORDER — HYDROCHLOROTHIAZIDE 25 MG
1 TABLET ORAL
Qty: 0 | Refills: 0 | DISCHARGE

## 2024-02-01 RX ORDER — ASPIRIN/CALCIUM CARB/MAGNESIUM 324 MG
1 TABLET ORAL
Qty: 0 | Refills: 0 | DISCHARGE

## 2024-02-01 RX ORDER — TICAGRELOR 90 MG/1
1 TABLET ORAL
Qty: 0 | Refills: 0 | DISCHARGE

## 2024-02-01 RX ORDER — BENAZEPRIL HYDROCHLORIDE 40 MG/1
1 TABLET ORAL
Refills: 0 | DISCHARGE

## 2024-02-01 RX ORDER — ASPIRIN/CALCIUM CARB/MAGNESIUM 324 MG
81 TABLET ORAL ONCE
Refills: 0 | Status: COMPLETED | OUTPATIENT
Start: 2024-02-01 | End: 2024-02-01

## 2024-02-01 RX ORDER — SODIUM CHLORIDE 9 MG/ML
1000 INJECTION INTRAMUSCULAR; INTRAVENOUS; SUBCUTANEOUS
Refills: 0 | Status: DISCONTINUED | OUTPATIENT
Start: 2024-02-01 | End: 2024-02-01

## 2024-02-01 RX ORDER — FAMOTIDINE 10 MG/ML
1 INJECTION INTRAVENOUS
Qty: 30 | Refills: 0
Start: 2024-02-01 | End: 2024-03-01

## 2024-02-01 RX ORDER — FENOFIBRATE,MICRONIZED 130 MG
1 CAPSULE ORAL
Refills: 0 | DISCHARGE

## 2024-02-01 RX ORDER — EZETIMIBE 10 MG/1
1 TABLET ORAL
Refills: 0 | DISCHARGE

## 2024-02-01 RX ORDER — ISOSORBIDE MONONITRATE 60 MG/1
1 TABLET, EXTENDED RELEASE ORAL
Qty: 0 | Refills: 0 | DISCHARGE

## 2024-02-01 RX ORDER — BENAZEPRIL HYDROCHLORIDE 40 MG/1
1 TABLET ORAL
Qty: 0 | Refills: 0 | DISCHARGE

## 2024-02-01 RX ORDER — METOPROLOL TARTRATE 50 MG
1 TABLET ORAL
Qty: 0 | Refills: 0 | DISCHARGE

## 2024-02-01 RX ADMIN — Medication 81 MILLIGRAM(S): at 12:03

## 2024-02-01 RX ADMIN — SODIUM CHLORIDE 150 MILLILITER(S): 9 INJECTION INTRAMUSCULAR; INTRAVENOUS; SUBCUTANEOUS at 14:03

## 2024-02-01 NOTE — PROGRESS NOTE ADULT - SUBJECTIVE AND OBJECTIVE BOX
Cardiology Follow up    MIKAL OLIVAS   63y Male  PAST MEDICAL & SURGICAL HISTORY:  CAD (coronary artery disease)      HTN (hypertension)      Dyslipidemia      History of umbilical hernia repair      Coronary artery disease  s/p PCI x1           HPI:  Patient is a 63y Male with PMH of CAD h/o NSTEMI with PCI 2018 to Ramus, HTN,, HLD, COPD, SOBEIDA (no longer needs CPAP), + family h/o CAD (father, sibling). He presented to cardiologist with c/o CP and shortness of breath with exertion x 1month, also with low BP.  Stress echo 24  (see below).   Patient presents today for [facility] with possible intervention.     Cardiac Cath PCI   2018: Ramus 3.5mm x 12mm Synergy    Stress echo 24  CONCLUSIONS  1.Abnormal exercise stress echocardiogram.  2.The electrocardiogram is non-diagnostic due to inadequate percent of maximum predicted heart rate   achieved however, no ischemic changes seen at 74 % of maximum heart rate achieved.  3.No echocardiographic evidence of exercise induced ischemia.  4.The patient underwent stress testing using the Standard Ishan protocol.  • The patient exercised for 6 min 15 sec. Test was stopped due to dyspnea, generalized fatigue and   patient request.  • The peak heart rate was 117 bpm: 74 % of predicted maximal heart rate for this patient.  • The patient achieved 7.4 METs which is consistent with average exercise capacity.  5.Negative for angina or the patient's characteristic chest pain.  6.Did not achieve target heart rate.  7.Heart rate might be attenuated by Metoprolol.  8.Physiologic blood pressure response to exercise.  9.No significant exercise-induced arrhythmias.  10.The Duke Treadmill Score is 6.00, which is consistent with low risk (=5) for future cardiac events.  11.Left ventricular systolic function is normal with an ejection fraction of 60 % by Elizalde's method of   disks.  12.Normal left ventricular diastolic function.  13.Mildly enlarged right ventricular cavity size, wall thickness, and normal systolic function. Tricuspid   annular plane systolic excursion (TAPSE) is 2.2 cm (normal >=1.7 cm).  14.Normal left and right atrial size.  15.No significant valvular disease.  16.Pulmonary artery systolic pressure could not be estimated.  17.No pericardial effusion seen      Pre cath note:  indication:  [ ] STEMI                [ ] NSTEMI                 [ ] Acute coronary syndrome                   [ ]Unstable Angina   [ ] high risk  [ ] intermediate risk  [ ] low risk                   [ x] Stable Angina     non-invasive testing:   stress echo                       Date:  24                   result: [ ] high risk  [ ] intermediate risk  [ ] low risk    Anti- Anginal medications:                    [ ] not used                       [x ] used                   [ ] not used but strong indication not to use  -bb  -nitrate    Ejection Fraction                   [ ] <29            [ ] 30-39%   [ ] 40-49%     [x ]>50%    CHF                   [ ] active (within last 14 days on meds   [ ] Chronic (on meds but no exacerbation)    COPD                   [x ] mild (on chronic bronchodilators)  [ ] moderate (on chronic steroid therapy)      [ ] severe (indication for home O2 or PACO2 >50)    Other risk factors:                     [ ] Previous MI                     [ ] CVA/ stroke                    [ ] carotid stent/ CEA                    [ ] PVD/PAD- (arterial aneurysm, non-palpable pulses, tortuous vessel with inability to insert catheter, infra-renal dissection, renal or subclavian artery stenosis)                    [ ] diabetic                    [ ] previous CABG                    [ ] Renal Failure     Bleeding Risk: 0.6%      RIGHT RADIAL ARTERY EVALUATION:  TONO TEST: positive      EF: 60% stress echo 24    EK24 SR with RBBB    Fluids:  ml bolus then 75ml/hr until cath  (2024 15:07)    Allergies    No Known Allergies    Intolerances      Patient seen and examined at bedside.   Patient without complaints.   Denies CP, SOB, palpitations, or dizziness    Vital Signs   HR: 67  BP: 118/75  SpO2: 95% on RA    MEDICATIONS  (STANDING):  aspirin  chewable 81 milliGRAM(s) Oral once  chlorhexidine 4% Liquid 1 Application(s) Topical once  sodium chloride 0.9%. 1000 milliLiter(s) (150 mL/Hr) IV Continuous <Continuous>    MEDICATIONS  (PRN):      REVIEW OF SYSTEMS:          All negative except as mentioned in HPI    PHYSICAL EXAM:           CONSTITUTIONAL: Well-developed; well-nourished; in no acute distress  	SKIN: warm, dry  	HEAD: Normocephalic; atraumatic  	EYES: PERRL.  	ENT: No nasal discharge, airway clear, mucous membranes moist  	NECK: Supple; non tender.  	CARD: +S1, +S2, no murmurs, gallops, or rubs. Regular rate and rhythm    	RESP: No wheezes, rales or rhonchi. CTA B/L  	ABD: soft ntnd, + BS x 4 quadrants  	EXT: moves all extremities,  no clubbing, cyanosis or edema  	NEURO: Alert and oriented x3, no focal deficits          PSYCH: Cooperative, appropriate          VASCULAR:  + Rad / + PTs / + DPs          EXTREMITY:            	   Right Radial: Dressing D/C/I, vasc band in place, access site soft, no hematoma, no pain, + pulses, no sign of infection, no numbness            ECG: pending    LABS:                        13.8   8.40  )-----------( 179      ( 2024 11:18 )             38.9     02-    143  |  104  |  24<H>  ----------------------------<  91  3.8   |  29  |  1.2    Ca    9.3      2024 11:18                  A/P:  I discussed the case with Cardiologist Dr. Martin  and recommend the following:    S/P PCI:      Intervention: Successful IVUS guided cutting balloon angioplasty with ZAIDA X 1 of prox LAD 70% calcified stenosis FFR positive 0.73  Implants: Synergy 3.5 X 16     FINDINGS:   Coronary Dominance: Co Dominant System  LM: Mild disease    LAD: Prox LAD 70% calcified stenosis FFR positive 0.73 s/p intervention  D1 mild disease    CX: mild disease  OM1 mild disease    Ramus: Mild disease, patent prior stent    RCA: Mild disease  RPDA Mild disease    LVEDP: 19 mmHg       	     Continue DAPT ( Aspirin 81 mg PO Daily and Plavix 75mg po daily),  B-Blocker, Statin Therapy, Benazepril                   Patient given 30 day supply of ( Aspirin 81 mg daily and Plavix 75 mg daily ) to take at home                   START Famotidine 20mg po daily                   CBC at 1730                    EKG prior to d/c at 1730                   NS at 150 ml/hr x 6hrs                   OOB Ambulate                    Monitor access site/ distal pulses                   Patient agreeing to take DAPT for at least one year or as directed by cardiologist                    Pt given instructions on importance of taking antiplatelet medication or risk acute stent thrombosis/death                   Post cath instructions, access site care and activity restrictions reviewed with patient                     Discussed with patient to return to hospital if experience chest pain, shortness breath, dizziness and site bleeding                   Aggressive risk factor modification, diet counseling, smoking cessation discussed with patient                    Benefits of cardiac rehab discussed with patient                    Cardiac rehab info provided/referral and communication to cardiac rehab completed                   Patient instructed to call cardiac rehab and make initial appointment after first follow-up visit with Cardiologist                    Can discharge patient at 1930 from cardiac standpoint after ambulating without symptoms, access site wnl, labs and ECG reviewed                    Follow up with Cardiology Dr. Dukes in two weeks.  Instructed to call and make an appointment

## 2024-02-01 NOTE — ASU DISCHARGE PLAN (ADULT/PEDIATRIC) - ASU DC SPECIAL INSTRUCTIONSFT
Discharge Instructions as follows:  - Continue medical regimen as prescribed to prevent chest pain.  - If you are diabetic and taking medication containing Metformin, do not take them for 48 hours after the procedure  - Continue dual anti-platelet therapy, beta blocker, Statin   - Instructed to call 911 if chest pain, shortness of breath or bleeding from access site.  - No heavy lifting > 10lbs x 1 week.  - No driving x 24 hours.  - No baths, swimming pools x 1 week, may shower  - Low sodium low fat low cholesterol diet  - Follow-up with Cardiologist in 1-2 weeks after discharge  - Soreness or tenderness at the site is possible, it will diminish over time. You may take Tylenol every 4-6 hours as needed. Nothing stronger is needed. NO Motrin/Ibuprofen  - Any questions call cardiac cath lab [phone]

## 2024-02-01 NOTE — ASU PATIENT PROFILE, ADULT - FALL HARM RISK - UNIVERSAL INTERVENTIONS
Bed in lowest position, wheels locked, appropriate side rails in place/Call bell, personal items and telephone in reach/Instruct patient to call for assistance before getting out of bed or chair/Non-slip footwear when patient is out of bed/[place] to call system/Physically safe environment - no spills, clutter or unnecessary equipment/Purposeful Proactive Rounding/Room/bathroom lighting operational, light cord in reach

## 2024-02-01 NOTE — CHART NOTE - NSCHARTNOTEFT_GEN_A_CORE
PRE-OP DIAGNOSIS:    Worsening Angina    PROCEDURE:     [x] Coronary Angiogram     [x] LHC     [] LVG     [] RHC     [x] Intervention (see below)         PHYSICIAN:  Dr Martin    ASSISTANT: Dr CECILIA Hallman     PROCEDURE DESCRIPTION:     Consent:      [x] Patient     [] Family Member     []  Used        Anesthesia:     [] General     [x] Sedation     [x] Local        Access & Closure:     [x] 6 Fr Right Radial Artery (Vasc band closure)     [] Fr Femoral Artery     [] Fr Femoral Vein     [] Fr Brachial Vein       IV Contrast: 100 mL        Intervention: Successful IVUS guided cutting balloon angioplasty with ZAIDA X 1 of prox LAD 70% calcified stenosis FFR positive 0.73      Implants: Synergy 3.5 X 16       FINDINGS:     Coronary Dominance: Co Dominant System      LM: Mild disease    LAD: Prox LAD 70% calcified stenosis FFR positive 0.73 s/p intervention  D1 mild disease    CX: mild disease  OM1 mild disease    Ramus: Mild disease, patent prior stent    RCA: Mild disease  RPDA Mild disease       LVEDP: 19 mmHg        ESTIMATED BLOOD LOSS: < 10 mL        CONDITION:     [x] Good     [] Fair     [] Critical        SPECIMEN REMOVED: N/A       POST-OP DIAGNOSIS:      [x] 1 Vessel Coronary Artery Disease. Prox LAD 70% calcified stenosis FFR positive 0.73 s/p intervention       PLAN OF CARE:     [x] D/C Home Today     [x] Medications: Aspirin 81 mg qd, Plavix 75mg qd, Pravastatin 10 mg qhs, Toprol 25mg qd, Imdur 30 mg qd and Benazepril 20 mg qd. Hold HCTZ for 48 hours    [x] IV Fluids: IV NS @ 150cc/hr for 6 hours PRE-OP DIAGNOSIS:    Worsening Angina    PROCEDURE:     [x] Coronary Angiogram     [x] LHC     [] LVG     [] RHC     [x] Intervention (see below)         PHYSICIAN:  Dr Martin    ASSISTANT: Dr CECILIA Hallman     PROCEDURE DESCRIPTION:     Consent:      [x] Patient     [] Family Member     []  Used        Anesthesia:     [] General     [x] Sedation     [x] Local        Access & Closure:     [x] 6 Fr Right Radial Artery (Vasc band closure)     [] Fr Femoral Artery     [] Fr Femoral Vein     [] Fr Brachial Vein       IV Contrast: 100 mL        Intervention: Successful IVUS guided cutting balloon angioplasty with ZAIDA X 1 of prox LAD 70% calcified stenosis FFR positive 0.73      Implants: Synergy 3.5 X 16       FINDINGS:     Coronary Dominance: Co Dominant System      LM: Mild disease    LAD: Prox LAD 70% calcified stenosis FFR positive 0.73 s/p intervention  D1 mild disease    CX: mild disease  OM1 mild disease    Ramus: Mild disease, patent prior stent    RCA: Mild disease  RPDA Mild disease       LVEDP: 19 mmHg        ESTIMATED BLOOD LOSS: < 10 mL        CONDITION:     [x] Good     [] Fair     [] Critical        SPECIMEN REMOVED: N/A       POST-OP DIAGNOSIS:      [x] 1 Vessel Coronary Artery Disease. Prox LAD 70% calcified stenosis FFR positive 0.73 s/p intervention       PLAN OF CARE:     [x] D/C Home Today     [x] Medications: Aspirin 81 mg qd, Plavix 75mg qd, Pravastatin 10 mg qhs, Toprol 25mg qd, Imdur 30 mg qd and Benazepril 20 mg qd. Hold HCTZ for 48 hours    [x] IV Fluids: IV NS @ 150cc/hr for 7 hours

## 2024-02-01 NOTE — ASU DISCHARGE PLAN (ADULT/PEDIATRIC) - CARE PROVIDER_API CALL
Lawrence Dukes  Cardiology  [address] [place] [id number]  Phone: [phone]  Fax: [phone]  Follow Up Time: 2 weeks

## 2024-02-07 ENCOUNTER — APPOINTMENT (OUTPATIENT)
Dept: CARDIOLOGY | Facility: CLINIC | Age: 64
End: 2024-02-07
Payer: COMMERCIAL

## 2024-02-07 VITALS — WEIGHT: 270 LBS | TEMPERATURE: 97.6 F | BODY MASS INDEX: 39.99 KG/M2 | HEIGHT: 69 IN

## 2024-02-07 VITALS — SYSTOLIC BLOOD PRESSURE: 130 MMHG | DIASTOLIC BLOOD PRESSURE: 74 MMHG

## 2024-02-07 VITALS — SYSTOLIC BLOOD PRESSURE: 140 MMHG | DIASTOLIC BLOOD PRESSURE: 90 MMHG | HEART RATE: 89 BPM

## 2024-02-07 DIAGNOSIS — I10 ESSENTIAL (PRIMARY) HYPERTENSION: ICD-10-CM

## 2024-02-07 DIAGNOSIS — I25.10 ATHEROSCLEROTIC HEART DISEASE OF NATIVE CORONARY ARTERY W/OUT ANGINA PECTORIS: ICD-10-CM

## 2024-02-07 DIAGNOSIS — R06.02 SHORTNESS OF BREATH: ICD-10-CM

## 2024-02-07 DIAGNOSIS — E78.00 PURE HYPERCHOLESTEROLEMIA, UNSPECIFIED: ICD-10-CM

## 2024-02-07 DIAGNOSIS — G47.33 OBSTRUCTIVE SLEEP APNEA (ADULT) (PEDIATRIC): ICD-10-CM

## 2024-02-07 PROCEDURE — 93000 ELECTROCARDIOGRAM COMPLETE: CPT

## 2024-02-07 PROCEDURE — 99214 OFFICE O/P EST MOD 30 MIN: CPT | Mod: 25

## 2024-02-07 NOTE — ASSESSMENT
[FreeTextEntry1] : CAD: Pt with CP/SOB. Nondiagnostic stress. S/P LHC with LAD stent.  -Continue with ASA, Plavix, pravastatin, ezetimibe, Toprol 25mg.   Hyperlipidemia: HLD: , , HDL 57, LDL 96 (01/23)->, , HDL 42, LDL 75 (01/24) - weight loss - encouraged patient to follow healthy exercise and eating habits, focusing on Mediterranean style of eating and aiming for the recommended 150 minutes per week of moderate physical activity. - continue Ezetimibe, Pravastatin and Fenofibrate 48 mg.    Coronary artery disease: S/P LAD PCI 02/24 - will continue Aspirin, Plavix, Metoprolol, statin.  -Referral to cardiac rehab.   HTN: BP at goal per ACC/AHA 2018 guidelines - will decrease Benazepril to 20 mg PO daily - continue HCTZ, Metoprolol, Isosorbide, Cardura  Follow up in 6 weeks-2 months.

## 2024-02-07 NOTE — PHYSICAL EXAM
[Well Developed] : well developed [Well Nourished] : well nourished [No Acute Distress] : no acute distress [Obese] : obese [Normal Conjunctiva] : normal conjunctiva [Normal Venous Pressure] : normal venous pressure [No Carotid Bruit] : no carotid bruit [5th Left ICS - MCL] : palpated at the 5th LICS in the midclavicular line [Normal] : normal [No Precordial Heave] : no precordial heave was noted [Normal Rate] : normal [Rhythm Regular] : regular [Normal S1] : normal S1 [Normal S2] : normal S2 [No Murmur] : no murmurs heard [No Pitting Edema] : no pitting edema present [2+] : left 2+ [No Respiratory Distress] : no respiratory distress  [Wheeze ____] : wheeze [unfilled] [Soft] : abdomen soft [Non Tender] : non-tender [No Masses/organomegaly] : no masses/organomegaly [Normal Bowel Sounds] : normal bowel sounds [Normal Gait] : normal gait [No Edema] : no edema [No Cyanosis] : no cyanosis [No Clubbing] : no clubbing [No Varicosities] : no varicosities [No Rash] : no rash [No Skin Lesions] : no skin lesions [Moves all extremities] : moves all extremities [No Focal Deficits] : no focal deficits [Normal Speech] : normal speech [Alert and Oriented] : alert and oriented [Normal memory] : normal memory

## 2024-02-07 NOTE — REVIEW OF SYSTEMS
[SOB] : shortness of breath [Chest Discomfort] : chest discomfort [Wheezing] : wheezing [Negative] : Heme/Lymph

## 2024-02-07 NOTE — HISTORY OF PRESENT ILLNESS
[FreeTextEntry1] : Mr. Moses is a 64yo M with PMHx of CAD s/p PCI (2018, Ramus), HTN, HLD, COPD, SOBEIDA (not using CPAP) who presents for follow up. His PMD is Dr. Ty Metz. Patient has been having chest pain (sharp in different locations) and SOB for about a month. He followed with pulmonology and was told that he has mild emphysema and was recommended to see a cardiologist. He also reports wheezing. These symptoms occur with exertion and at rest. The patient complains of fatigue which doesn't improve after sleep. Last visit with cardiology was a year ago with Dr. Stuart. Denies palpitations. The patient had sleep studies in 01/2023 and was diagnosed with mild SOBEIDA.  01/25/24: Patient was hypotensive yesterday when presented for stress ECHO BP 89/56, big discrepancy with his hand watch device showing /80. Stress ECHO was not diagnostic for ischemia as patient didn't achieve target HR. Today, patient didn't take his BP medications today, his /80. He reports having chest discomfort today in the middle of the night. Severe coronary calcifications in LAD on recent chest CT.  02/07/24-Patient s/p LHC which showed pLAD lesion and is s/p PCI. He sometimes still has pinching CP.

## 2024-02-07 NOTE — REASON FOR VISIT
[Other: ____] : [unfilled] [FreeTextEntry1] : Diagnostic Tests: ----------------------- EK24-NSR. Incomplete RBBB.  24-NSR. Incomplete RBBB.  23-NSR. Incomplete RBBB.  10/13/23-NSR. PRWP.  ----------------------- Echo:  24-TTE: EF 60%. Mild RV enlargement. Asc Ao 3.7cm.  ----------------------- Stress:  24-Exercise TTE: Exercise 6:15 min. 74% MPHR. METS 7.4. Patient did not achieve target HR. Became dyspneic. Hypotensive at baseline.  ------------------------ US:  05/10/18-LE arterial: Negative for significant stenosis.  ----------------------- Chest CT: 2023: Coronary calcifications, severe in LAD, moderate in LCx, RCA, 4 mm lung nodule. Atelectasis LLL.  ----------------------- Cath:  24-LHC: LM mild, pLAD 70% (FFR 0.73), D1 mild, Lcx mild, OM1 mild, Ramus mild stent patent, RCA mild, RPDA mild. LVEDP 19 mmHg. S/P PCI to pLAD with Synergy 3.5x16 mm.

## 2024-02-22 ENCOUNTER — NON-APPOINTMENT (OUTPATIENT)
Age: 64
End: 2024-02-22

## 2024-02-29 ENCOUNTER — APPOINTMENT (OUTPATIENT)
Dept: PULMONOLOGY | Facility: CLINIC | Age: 64
End: 2024-02-29
Payer: COMMERCIAL

## 2024-02-29 VITALS
WEIGHT: 272 LBS | BODY MASS INDEX: 40.29 KG/M2 | OXYGEN SATURATION: 96 % | SYSTOLIC BLOOD PRESSURE: 128 MMHG | HEART RATE: 75 BPM | DIASTOLIC BLOOD PRESSURE: 88 MMHG | HEIGHT: 69 IN

## 2024-02-29 DIAGNOSIS — E66.9 OBESITY, UNSPECIFIED: ICD-10-CM

## 2024-02-29 DIAGNOSIS — J44.9 CHRONIC OBSTRUCTIVE PULMONARY DISEASE, UNSPECIFIED: ICD-10-CM

## 2024-02-29 PROCEDURE — 99213 OFFICE O/P EST LOW 20 MIN: CPT

## 2024-02-29 PROCEDURE — G2211 COMPLEX E/M VISIT ADD ON: CPT | Mod: NC,1L

## 2024-02-29 NOTE — REASON FOR VISIT
[COPD] : COPD [Follow-Up] : a follow-up visit [Obesity] : obesity [TextBox_44] : Doing very well in general.  He had an angioplasty and states that he is feeling dramatically better.  He has good ADLs and is using his albuterol only 2 or 3 times a week at the most.  He is not having any flareups.  He is sleeping well without any snoring or interruptions.

## 2024-02-29 NOTE — PHYSICAL EXAM
[No Acute Distress] : no acute distress [Normal Oropharynx] : normal oropharynx [Normal Appearance] : normal appearance [No Neck Mass] : no neck mass [Normal Rate/Rhythm] : normal rate/rhythm [Normal S1, S2] : normal s1, s2 [No Murmurs] : no murmurs [No Resp Distress] : no resp distress [Clear to Auscultation Bilaterally] : clear to auscultation bilaterally [No Abnormalities] : no abnormalities [Benign] : benign [Normal Gait] : normal gait [No Cyanosis] : no cyanosis [No Clubbing] : no clubbing [No Edema] : no edema [FROM] : FROM [Normal Color/ Pigmentation] : normal color/ pigmentation [No Focal Deficits] : no focal deficits [Oriented x3] : oriented x3 [Normal Affect] : normal affect [TextBox_68] : Harsh breath sounds

## 2024-02-29 NOTE — ASSESSMENT
[FreeTextEntry1] : Assessment: COPD stable:  plan: Stress compliance with inhalers. Renewed today. cont PRN albuterol needs PFT on next visit weight loss CT chest was offered but the patient refused. The patient is aware of the risk of not getting the CT. D/C smoking was discussed with the patient F/U 6 months

## 2024-03-13 ENCOUNTER — NON-APPOINTMENT (OUTPATIENT)
Age: 64
End: 2024-03-13

## 2024-03-13 NOTE — HISTORY OF PRESENT ILLNESS
[FreeTextEntry1] : Mr. Moses is a 62yo M with PMHx of CAD s/p PCI (2018, Ram[facility]), HTN, HLD, COPD, SOBEIDA (not using CPAP) who presents for follow up. His PMD is Dr. Ty Metz. Patient has been having chest pain (sharp in different locations) and SOB for about a month. He followed with pulmonology and was told that he has mild emphysema and was recommended to see a cardiologist. He also reports wheezing. These symptoms occur with exertion and at rest. The patient complains of fatigue which doesn't improve after sleep. Last visit with cardiology was a year ago with Dr. Stuart. Denies palpitations. The patient had sleep studies in 01/2023 and was diagnosed with mild SOBEIDA.  01/25/24: Patient was hypotensive yesterday when presented for stress ECHO BP 89/56, big discrepancy with his hand watch device showing /80. Stress ECHO was not diagnostic for ischemia as patient didn't achieve target HR. Today, patient didn't take his BP medications today, his /80. He reports having chest discomfort today in the middle of the night. Severe coronary calcifications in LAD on recent chest CT.  02/07/24-Patient s/p LHC which showed pLAD lesion and is s/p PCI. He sometimes still has pinching CP.

## 2024-03-13 NOTE — PHYSICAL EXAM
[Well Developed] : well developed [Well Nourished] : well nourished [No Acute Distress] : no acute distress [Obese] : obese [Normal Conjunctiva] : normal conjunctiva [Normal Venous Pressure] : normal venous pressure [No Carotid Bruit] : no carotid bruit [5th Left ICS - MCL] : palpated at the 5th LICS in the midclavicular line [Normal] : normal [No Precordial Heave] : no precordial heave was noted [Normal Rate] : normal [Rhythm Regular] : regular [Normal S1] : normal S1 [Normal S2] : normal S2 [No Murmur] : no murmurs heard [No Pitting Edema] : no pitting edema present [2+] : left 2+ [No Respiratory Distress] : no respiratory distress  [Wheeze ____] : wheeze [unfilled] [Soft] : abdomen soft [Non Tender] : non-tender [No Masses/organomegaly] : no masses/organomegaly [Normal Bowel Sounds] : normal bowel sounds [Normal Gait] : normal gait [No Edema] : no edema [No Cyanosis] : no cyanosis [No Clubbing] : no clubbing [No Varicosities] : no varicosities [No Skin Lesions] : no skin lesions [No Rash] : no rash [Moves all extremities] : moves all extremities [No Focal Deficits] : no focal deficits [Normal Speech] : normal speech [Alert and Oriented] : alert and oriented [Normal memory] : normal memory

## 2024-03-13 NOTE — PHYSICAL EXAM
[Well Developed] : well developed [Well Nourished] : well nourished [Obese] : obese [No Acute Distress] : no acute distress [Normal Conjunctiva] : normal conjunctiva [Normal Venous Pressure] : normal venous pressure [5th Left ICS - MCL] : palpated at the 5th LICS in the midclavicular line [No Carotid Bruit] : no carotid bruit [Normal] : normal [No Precordial Heave] : no precordial heave was noted [Normal Rate] : normal [Rhythm Regular] : regular [Normal S1] : normal S1 [Normal S2] : normal S2 [No Pitting Edema] : no pitting edema present [No Murmur] : no murmurs heard [2+] : left 2+ [Wheeze ____] : wheeze [unfilled] [No Respiratory Distress] : no respiratory distress  [Soft] : abdomen soft [Non Tender] : non-tender [No Masses/organomegaly] : no masses/organomegaly [Normal Bowel Sounds] : normal bowel sounds [Normal Gait] : normal gait [No Edema] : no edema [No Cyanosis] : no cyanosis [No Clubbing] : no clubbing [No Varicosities] : no varicosities [No Skin Lesions] : no skin lesions [No Rash] : no rash [Moves all extremities] : moves all extremities [No Focal Deficits] : no focal deficits [Alert and Oriented] : alert and oriented [Normal Speech] : normal speech [Normal memory] : normal memory

## 2024-03-14 ENCOUNTER — APPOINTMENT (OUTPATIENT)
Dept: CARDIOLOGY | Facility: CLINIC | Age: 64
End: 2024-03-14
Payer: COMMERCIAL

## 2024-03-14 VITALS — SYSTOLIC BLOOD PRESSURE: 96 MMHG | HEART RATE: 74 BPM | DIASTOLIC BLOOD PRESSURE: 80 MMHG

## 2024-03-14 VITALS — SYSTOLIC BLOOD PRESSURE: 102 MMHG | DIASTOLIC BLOOD PRESSURE: 88 MMHG

## 2024-03-14 VITALS — WEIGHT: 273 LBS | HEIGHT: 69 IN | TEMPERATURE: 97.6 F | BODY MASS INDEX: 40.43 KG/M2

## 2024-03-14 PROCEDURE — 93000 ELECTROCARDIOGRAM COMPLETE: CPT

## 2024-03-14 PROCEDURE — 99214 OFFICE O/P EST MOD 30 MIN: CPT | Mod: 25

## 2024-03-14 RX ORDER — CLOPIDOGREL BISULFATE 75 MG/1
75 TABLET, FILM COATED ORAL DAILY
Qty: 90 | Refills: 3 | Status: ACTIVE | COMMUNITY
Start: 1900-01-01 | End: 1900-01-01

## 2024-03-14 RX ORDER — BENAZEPRIL HYDROCHLORIDE 10 MG/1
10 TABLET, FILM COATED ORAL DAILY
Qty: 90 | Refills: 3 | Status: ACTIVE | COMMUNITY
Start: 1900-01-01 | End: 1900-01-01

## 2024-03-14 NOTE — HISTORY OF PRESENT ILLNESS
[FreeTextEntry1] : Mr. Moses is a 64yo M with PMHx of CAD s/p PCI (2018, Ram[facility]), HTN, HLD, COPD, SOBEIDA (not using CPAP) who presents for follow up. His PMD is Dr. Ty Metz. Patient has been having chest pain (sharp in different locations) and SOB for about a month. He followed with pulmonology and was told that he has mild emphysema and was recommended to see a cardiologist. He also reports wheezing. These symptoms occur with exertion and at rest. The patient complains of fatigue which doesn't improve after sleep. Last visit with cardiology was a year ago with Dr. Stuart. Denies palpitations. The patient had sleep studies in 01/2023 and was diagnosed with mild SOBEIDA.  01/25/24: Patient was hypotensive yesterday when presented for stress ECHO BP 89/56, big discrepancy with his hand watch device showing /80. Stress ECHO was not diagnostic for ischemia as patient didn't achieve target HR. Today, patient didn't take his BP medications today, his /80. He reports having chest discomfort today in the middle of the night. Severe coronary calcifications in LAD on recent chest CT.  02/07/24-Patient s/p LHC which showed pLAD lesion and is s/p PCI. He sometimes still has pinching CP.  03/14/24-Patient still with some intermittent CP, but similar to prior to LHC/PCI. He started having rash on his face. He also feels fatigued. His SOB has greatly improved.

## 2024-03-14 NOTE — ASSESSMENT
[FreeTextEntry1] : CAD: Pt with CP/SOB. Nondiagnostic stress. S/P LHC with LAD stent.  -Continue with ASA, Plavix, pravastatin, ezetimibe, Toprol 25mg.   Hyperlipidemia: HLD: , , HDL 57, LDL 96 (01/23)->, , HDL 42, LDL 75 (01/24) - weight loss - encouraged patient to follow healthy exercise and eating habits, focusing on Mediterranean style of eating and aiming for the recommended 150 minutes per week of moderate physical activity. - continue Ezetimibe, Pravastatin and Fenofibrate 48 mg.    Coronary artery disease: S/P LAD PCI 02/24 - will continue Aspirin, Plavix, Metoprolol, statin.  -Referral to cardiac rehab.   HTN: BP at goal per ACC/AHA 2018 guidelines - will decrease Benazepril 20 mg to 10mg PO daily - continue HCTZ, Metoprolol, Isosorbide, Cardura -May need to further lower Cardura.   Follow up in 2 months.  -Check labs, including inflammatory markers.

## 2024-03-14 NOTE — REASON FOR VISIT
[Other: ____] : [unfilled] [FreeTextEntry1] : Diagnostic Tests: ----------------------- EK24-NSR. Incomplete RBBB.  24-NSR. Incomplete RBBB.  24-NSR. Incomplete RBBB.  23-NSR. Incomplete RBBB.  10/13/23-NSR. PRWP.  ----------------------- Echo:  24-TTE: EF 60%. Mild RV enlargement. Asc Ao 3.7cm.  ----------------------- Stress:  24-Exercise TTE: Exercise 6:15 min. 74% MPHR. METS 7.4. Patient did not achieve target HR. Became dyspneic. Hypotensive at baseline.  ------------------------ US:  05/10/18-LE arterial: Negative for significant stenosis.  ----------------------- Chest CT: 2023: Coronary calcifications, severe in LAD, moderate in LCx, RCA, 4 mm lung nodule. Atelectasis LLL.  ----------------------- Cath:  24-LHC: LM mild, pLAD 70% (FFR 0.73), D1 mild, Lcx mild, OM1 mild, Ramus mild stent patent, RCA mild, RPDA mild. LVEDP 19 mmHg. S/P PCI to pLAD with Synergy 3.5x16 mm.

## 2024-04-11 ENCOUNTER — NON-APPOINTMENT (OUTPATIENT)
Age: 64
End: 2024-04-11

## 2024-05-13 ENCOUNTER — APPOINTMENT (OUTPATIENT)
Dept: CARDIOLOGY | Facility: CLINIC | Age: 64
End: 2024-05-13

## 2024-09-16 ENCOUNTER — INPATIENT (INPATIENT)
Facility: HOSPITAL | Age: 64
LOS: 2 days | Discharge: HOME | DRG: 316 | End: 2024-09-19
Attending: INTERNAL MEDICINE | Admitting: INTERNAL MEDICINE
Payer: COMMERCIAL

## 2024-09-16 VITALS
TEMPERATURE: 98 F | OXYGEN SATURATION: 95 % | WEIGHT: 259.93 LBS | HEART RATE: 79 BPM | RESPIRATION RATE: 17 BRPM | HEIGHT: 71 IN | DIASTOLIC BLOOD PRESSURE: 113 MMHG | SYSTOLIC BLOOD PRESSURE: 180 MMHG

## 2024-09-16 DIAGNOSIS — Z98.890 OTHER SPECIFIED POSTPROCEDURAL STATES: Chronic | ICD-10-CM

## 2024-09-16 DIAGNOSIS — I25.10 ATHEROSCLEROTIC HEART DISEASE OF NATIVE CORONARY ARTERY WITHOUT ANGINA PECTORIS: Chronic | ICD-10-CM

## 2024-09-16 LAB
ALBUMIN SERPL ELPH-MCNC: 4.7 G/DL — SIGNIFICANT CHANGE UP (ref 3.5–5.2)
ALP SERPL-CCNC: 52 U/L — SIGNIFICANT CHANGE UP (ref 30–115)
ALT FLD-CCNC: 22 U/L — SIGNIFICANT CHANGE UP (ref 0–41)
ANION GAP SERPL CALC-SCNC: 10 MMOL/L — SIGNIFICANT CHANGE UP (ref 7–14)
AST SERPL-CCNC: 23 U/L — SIGNIFICANT CHANGE UP (ref 0–41)
BASOPHILS # BLD AUTO: 0.04 K/UL — SIGNIFICANT CHANGE UP (ref 0–0.2)
BASOPHILS NFR BLD AUTO: 0.5 % — SIGNIFICANT CHANGE UP (ref 0–1)
BILIRUB SERPL-MCNC: <0.2 MG/DL — SIGNIFICANT CHANGE UP (ref 0.2–1.2)
BUN SERPL-MCNC: 33 MG/DL — HIGH (ref 10–20)
CALCIUM SERPL-MCNC: 9.6 MG/DL — SIGNIFICANT CHANGE UP (ref 8.4–10.5)
CHLORIDE SERPL-SCNC: 106 MMOL/L — SIGNIFICANT CHANGE UP (ref 98–110)
CO2 SERPL-SCNC: 31 MMOL/L — SIGNIFICANT CHANGE UP (ref 17–32)
CREAT SERPL-MCNC: 1.2 MG/DL — SIGNIFICANT CHANGE UP (ref 0.7–1.5)
EGFR: 68 ML/MIN/1.73M2 — SIGNIFICANT CHANGE UP
EOSINOPHIL # BLD AUTO: 0.24 K/UL — SIGNIFICANT CHANGE UP (ref 0–0.7)
EOSINOPHIL NFR BLD AUTO: 2.8 % — SIGNIFICANT CHANGE UP (ref 0–8)
GLUCOSE SERPL-MCNC: 95 MG/DL — SIGNIFICANT CHANGE UP (ref 70–99)
HCT VFR BLD CALC: 43.1 % — SIGNIFICANT CHANGE UP (ref 42–52)
HGB BLD-MCNC: 14 G/DL — SIGNIFICANT CHANGE UP (ref 14–18)
IMM GRANULOCYTES NFR BLD AUTO: 0.4 % — HIGH (ref 0.1–0.3)
LYMPHOCYTES # BLD AUTO: 2.7 K/UL — SIGNIFICANT CHANGE UP (ref 1.2–3.4)
LYMPHOCYTES # BLD AUTO: 31.7 % — SIGNIFICANT CHANGE UP (ref 20.5–51.1)
MCHC RBC-ENTMCNC: 29.7 PG — SIGNIFICANT CHANGE UP (ref 27–31)
MCHC RBC-ENTMCNC: 32.5 G/DL — SIGNIFICANT CHANGE UP (ref 32–37)
MCV RBC AUTO: 91.3 FL — SIGNIFICANT CHANGE UP (ref 80–94)
MONOCYTES # BLD AUTO: 0.77 K/UL — HIGH (ref 0.1–0.6)
MONOCYTES NFR BLD AUTO: 9 % — SIGNIFICANT CHANGE UP (ref 1.7–9.3)
NEUTROPHILS # BLD AUTO: 4.73 K/UL — SIGNIFICANT CHANGE UP (ref 1.4–6.5)
NEUTROPHILS NFR BLD AUTO: 55.6 % — SIGNIFICANT CHANGE UP (ref 42.2–75.2)
NRBC # BLD: 0 /100 WBCS — SIGNIFICANT CHANGE UP (ref 0–0)
PLATELET # BLD AUTO: 241 K/UL — SIGNIFICANT CHANGE UP (ref 130–400)
PMV BLD: 9.6 FL — SIGNIFICANT CHANGE UP (ref 7.4–10.4)
POTASSIUM SERPL-MCNC: 4.3 MMOL/L — SIGNIFICANT CHANGE UP (ref 3.5–5)
POTASSIUM SERPL-SCNC: 4.3 MMOL/L — SIGNIFICANT CHANGE UP (ref 3.5–5)
PROT SERPL-MCNC: 6.9 G/DL — SIGNIFICANT CHANGE UP (ref 6–8)
RBC # BLD: 4.72 M/UL — SIGNIFICANT CHANGE UP (ref 4.7–6.1)
RBC # FLD: 12.6 % — SIGNIFICANT CHANGE UP (ref 11.5–14.5)
SODIUM SERPL-SCNC: 147 MMOL/L — HIGH (ref 135–146)
WBC # BLD: 8.51 K/UL — SIGNIFICANT CHANGE UP (ref 4.8–10.8)
WBC # FLD AUTO: 8.51 K/UL — SIGNIFICANT CHANGE UP (ref 4.8–10.8)

## 2024-09-16 PROCEDURE — 71045 X-RAY EXAM CHEST 1 VIEW: CPT | Mod: 26

## 2024-09-16 PROCEDURE — 99285 EMERGENCY DEPT VISIT HI MDM: CPT

## 2024-09-16 RX ORDER — NITROGLYCERIN 0.2MG/HR
0.4 PATCH, TRANSDERMAL 24 HOURS TRANSDERMAL ONCE
Refills: 0 | Status: COMPLETED | OUTPATIENT
Start: 2024-09-16 | End: 2024-09-16

## 2024-09-16 RX ORDER — KETOROLAC TROMETHAMINE 30 MG/ML
15 INJECTION, SOLUTION INTRAMUSCULAR ONCE
Refills: 0 | Status: DISCONTINUED | OUTPATIENT
Start: 2024-09-16 | End: 2024-09-16

## 2024-09-16 RX ADMIN — KETOROLAC TROMETHAMINE 15 MILLIGRAM(S): 30 INJECTION, SOLUTION INTRAMUSCULAR at 22:26

## 2024-09-16 RX ADMIN — Medication 0.4 MILLIGRAM(S): at 22:26

## 2024-09-16 NOTE — ED ADULT TRIAGE NOTE - CHIEF COMPLAINT QUOTE
midsternal chest pain x few days, becoming worse today  had stents in past, most recent stent 02/2024

## 2024-09-16 NOTE — ED PROVIDER NOTE - CARE PLAN
Assessment and plan of treatment:	chest pain  ekg, cxr, labs, supportive care  cardiac monitoring   1 Principal Discharge DX:	Chest pain  Assessment and plan of treatment:	chest pain  ekg, cxr, labs, supportive care  cardiac monitoring

## 2024-09-16 NOTE — ED PROVIDER NOTE - OBJECTIVE STATEMENT
64 year old male, past medical history cad s/p 2 stents, htn, hdl, who presents with chest pain. patient with intermittent episodes of central chest pain described as pressure, non-exertional, non-pleuritic. patient reports last stent placed 2/2024, no recent cardiac workup. denies f/c, shortness of breath, cough, vomiting/diarrhea, leg pain/swelling. 64 year old male, past medical history cad s/p 2 stents, htn, hdl, who presents with chest pain. patient with intermittent episodes of central chest pain described as pressure, non-exertional, non-pleuritic. patient reports last stent placed 2/2024, no recent cardiac workup. denies f/c, shortness of breath, cough, vomiting/diarrhea, leg pain/swelling. patient follows with dr ba larios, cardiologist.

## 2024-09-16 NOTE — ED ADULT NURSE NOTE - SUICIDE SCREENING QUESTION 3
31 year old female presents to ed cc of sore throat since Thursday patient denies fever chills n/v at this time. Patient awake alert ox4 in no acute distress nurse will continue to monitor.   
No

## 2024-09-16 NOTE — ED PROVIDER NOTE - INPATIENT RESIDENT/ACP NOTIFIED DATE
Chief Complaint   Patient presents with    Breast pain    Annual Wellness Visit    Anemia     1. Have you been to the ER, urgent care clinic since your last visit? Hospitalized since your last visit? No    2. Have you seen or consulted any other health care providers outside of the [address] [place] [address] since your last visit? Include any pap smears or colon screening.  No    ADL Assessment 3/29/2018   Feeding yourself No Help Needed   Getting from bed to chair Help Needed   Getting dressed No Help Needed   Bathing or showering No Help Needed   Walk across the room (includes cane/walker) Help Needed   Using the telphone No Help Needed   Taking your medications Help Needed   Preparing meals Help Needed   Managing money (expenses/bills) Help Needed   Moderately strenuous housework (laundry) Help Needed   Shopping for personal items (toiletries/medicines) Help Needed   Shopping for groceries Help Needed   Driving Help Needed   Climbing a flight of stairs Help Needed   Getting to places beyond walking distances Help Needed       Lab slip printed and given to pt 17-Sep-2024 01:41

## 2024-09-16 NOTE — ED PROVIDER NOTE - ATTENDING APP SHARED VISIT CONTRIBUTION OF CARE
pt w cad,and stents co intermitted cp, substernal pressure for 4 days. he stopped his plavix on his own 3 days ago bc "I don't like the side effects". hes been on it since Feb post pci/stent.    VITAL SIGNS: I have reviewed nursing notes and confirm.  CONSTITUTIONAL: Well-developed; well-nourished; in no acute distress.  SKIN: Skin exam is warm and dry, no acute rash.  HEAD: Normocephalic; atraumatic.  EYES: PERRL, EOM intact; conjunctiva and sclera clear.  ENT: No nasal discharge; airway clear.   NECK: Supple; non tender.  CARD:+ S1, S2   RESP: No wheezes, rales or rhonchi.  ABD: Normal bowel sounds; soft; non-distended; non-tender;  EXT: Normal ROM. No cyanosis or edema.  LYMPH: No acute adenopathy.  NEURO: Alert. Grossly unremarkable. No focal deficits.  PSYCH: Cooperative, appropriate.

## 2024-09-16 NOTE — ED ADULT NURSE NOTE - OBJECTIVE STATEMENT
Pt presents to the ED complaining of midsternal chest pain that began today. Pt states past history of MI with stent placement in 208, with another stent placed earlier this year.

## 2024-09-17 DIAGNOSIS — R94.39 ABNORMAL RESULT OF OTHER CARDIOVASCULAR FUNCTION STUDY: ICD-10-CM

## 2024-09-17 LAB
TROPONIN T, HIGH SENSITIVITY RESULT: 12 NG/L — SIGNIFICANT CHANGE UP (ref 6–21)
TROPONIN T, HIGH SENSITIVITY RESULT: 14 NG/L — SIGNIFICANT CHANGE UP (ref 6–21)

## 2024-09-17 PROCEDURE — 99233 SBSQ HOSP IP/OBS HIGH 50: CPT

## 2024-09-17 PROCEDURE — 99222 1ST HOSP IP/OBS MODERATE 55: CPT

## 2024-09-17 PROCEDURE — 78452 HT MUSCLE IMAGE SPECT MULT: CPT | Mod: 26,MC

## 2024-09-17 PROCEDURE — 93458 L HRT ARTERY/VENTRICLE ANGIO: CPT

## 2024-09-17 PROCEDURE — C1887: CPT

## 2024-09-17 PROCEDURE — 93307 TTE W/O DOPPLER COMPLETE: CPT

## 2024-09-17 PROCEDURE — C1894: CPT

## 2024-09-17 PROCEDURE — C1769: CPT

## 2024-09-17 RX ORDER — ALLOPURINOL 300 MG
300 TABLET ORAL DAILY
Refills: 0 | Status: DISCONTINUED | OUTPATIENT
Start: 2024-09-17 | End: 2024-09-19

## 2024-09-17 RX ORDER — SODIUM CHLORIDE 9 MG/ML
1000 INJECTION INTRAMUSCULAR; INTRAVENOUS; SUBCUTANEOUS
Refills: 0 | Status: DISCONTINUED | OUTPATIENT
Start: 2024-09-18 | End: 2024-09-19

## 2024-09-17 RX ORDER — METOPROLOL TARTRATE 100 MG/1
25 TABLET ORAL DAILY
Refills: 0 | Status: DISCONTINUED | OUTPATIENT
Start: 2024-09-17 | End: 2024-09-17

## 2024-09-17 RX ORDER — DOXAZOSIN MESYLATE 1 MG
4 TABLET ORAL AT BEDTIME
Refills: 0 | Status: DISCONTINUED | OUTPATIENT
Start: 2024-09-17 | End: 2024-09-19

## 2024-09-17 RX ORDER — ASPIRIN 81 MG
81 TABLET, DELAYED RELEASE (ENTERIC COATED) ORAL DAILY
Refills: 0 | Status: DISCONTINUED | OUTPATIENT
Start: 2024-09-17 | End: 2024-09-19

## 2024-09-17 RX ORDER — ISOSORBIDE MONONITRATE 30 MG/1
30 TABLET, EXTENDED RELEASE ORAL DAILY
Refills: 0 | Status: DISCONTINUED | OUTPATIENT
Start: 2024-09-17 | End: 2024-09-19

## 2024-09-17 RX ORDER — LISINOPRIL 10 MG/1
10 TABLET ORAL DAILY
Refills: 0 | Status: DISCONTINUED | OUTPATIENT
Start: 2024-09-17 | End: 2024-09-19

## 2024-09-17 RX ORDER — EZETIMIBE 10 MG/1
10 TABLET ORAL DAILY
Refills: 0 | Status: DISCONTINUED | OUTPATIENT
Start: 2024-09-17 | End: 2024-09-19

## 2024-09-17 RX ORDER — FAMOTIDINE 10 MG/ML
20 INJECTION INTRAVENOUS DAILY
Refills: 0 | Status: DISCONTINUED | OUTPATIENT
Start: 2024-09-17 | End: 2024-09-19

## 2024-09-17 RX ORDER — BENAZEPRIL HYDROCHLORIDE 40 MG/1
1 TABLET, FILM COATED ORAL
Refills: 0 | DISCHARGE

## 2024-09-17 RX ORDER — FENOFIBRATE 145 MG/1
48 TABLET, FILM COATED ORAL DAILY
Refills: 0 | Status: DISCONTINUED | OUTPATIENT
Start: 2024-09-17 | End: 2024-09-19

## 2024-09-17 RX ORDER — REGADENOSON 0.08 MG/ML
0.4 INJECTION, SOLUTION INTRAVENOUS ONCE
Refills: 0 | Status: COMPLETED | OUTPATIENT
Start: 2024-09-17 | End: 2024-09-17

## 2024-09-17 RX ORDER — HEPARIN SODIUM,BOVINE 1000/ML
5000 VIAL (ML) INJECTION EVERY 12 HOURS
Refills: 0 | Status: DISCONTINUED | OUTPATIENT
Start: 2024-09-17 | End: 2024-09-19

## 2024-09-17 RX ORDER — ADENOSINE 3 MG/ML
60 INJECTION INTRAVENOUS ONCE
Refills: 0 | Status: DISCONTINUED | OUTPATIENT
Start: 2024-09-17 | End: 2024-09-17

## 2024-09-17 RX ORDER — METOPROLOL TARTRATE 100 MG/1
12.5 TABLET ORAL
Refills: 0 | Status: DISCONTINUED | OUTPATIENT
Start: 2024-09-17 | End: 2024-09-19

## 2024-09-17 RX ORDER — ACETAMINOPHEN 325 MG/1
1000 TABLET ORAL ONCE
Refills: 0 | Status: COMPLETED | OUTPATIENT
Start: 2024-09-17 | End: 2024-09-18

## 2024-09-17 RX ADMIN — REGADENOSON 0.4 MILLIGRAM(S): 0.08 INJECTION, SOLUTION INTRAVENOUS at 14:12

## 2024-09-17 RX ADMIN — Medication 81 MILLIGRAM(S): at 01:59

## 2024-09-17 RX ADMIN — Medication 75 MILLIGRAM(S): at 01:59

## 2024-09-17 RX ADMIN — Medication 40 MILLIGRAM(S): at 21:12

## 2024-09-17 RX ADMIN — FAMOTIDINE 20 MILLIGRAM(S): 10 INJECTION INTRAVENOUS at 02:02

## 2024-09-17 RX ADMIN — METOPROLOL TARTRATE 25 MILLIGRAM(S): 100 TABLET ORAL at 01:59

## 2024-09-17 RX ADMIN — EZETIMIBE 10 MILLIGRAM(S): 10 TABLET ORAL at 18:37

## 2024-09-17 RX ADMIN — FENOFIBRATE 48 MILLIGRAM(S): 145 TABLET, FILM COATED ORAL at 18:37

## 2024-09-17 RX ADMIN — LISINOPRIL 10 MILLIGRAM(S): 10 TABLET ORAL at 18:37

## 2024-09-17 RX ADMIN — Medication 4 MILLIGRAM(S): at 21:12

## 2024-09-17 NOTE — H&P ADULT - NSHPPHYSICALEXAM_GEN_ALL_CORE
LOS:     VITALS:   T(C): 37.3 (09-17-24 @ 15:55), Max: 37.3 (09-17-24 @ 15:55)  HR: 63 (09-17-24 @ 15:55) (63 - 79)  BP: 157/90 (09-17-24 @ 15:55) (146/73 - 180/113)  RR: 18 (09-17-24 @ 15:55) (17 - 18)  SpO2: 97% (09-17-24 @ 15:55) (95% - 97%)    GENERAL: NAD, lying in bed comfortably  HEAD:  Atraumatic, Normocephalic  EYES: EOMI, PERRLA, conjunctiva and sclera clear  ENT: Moist mucous membranes  NECK: Supple, No JVD  CHEST/LUNG: Clear to auscultation bilaterally; No rales, rhonchi, wheezing, or rubs. Unlabored respirations  HEART: Regular rate and rhythm; No murmurs, rubs, or gallops  ABDOMEN: BSx4; Soft, nontender, nondistended  EXTREMITIES:  2+ Peripheral Pulses, brisk capillary refill. No clubbing, cyanosis, or edema  NERVOUS SYSTEM:  A&Ox3, no focal deficits   SKIN: No rashes or lesions

## 2024-09-17 NOTE — ED CDU PROVIDER INITIAL DAY NOTE - NSICDXPASTSURGICALHX_GEN_ALL_CORE_FT
PAST SURGICAL HISTORY:  Coronary artery disease s/p PCI x1    History of umbilical hernia repair      Consent 3/Introductory Paragraph: I gave the patient a chance to ask questions they had about the procedure.  Following this I explained the Mohs procedure and consent was obtained. The risks, benefits and alternatives to therapy were discussed in detail. Specifically, the risks of infection, scarring, bleeding, prolonged wound healing, incomplete removal, allergy to anesthesia, nerve injury and recurrence were addressed. Prior to the procedure, the treatment site was clearly identified and confirmed by the patient. All components of Universal Protocol/PAUSE Rule completed.

## 2024-09-17 NOTE — ED CDU PROVIDER INITIAL DAY NOTE - PROGRESS NOTE DETAILS
Received sign out this AM. Patient feeling well at present; denies chest pain, dyspnea and additional symptoms. He is pending nuclear stress testing today. Spoke with stress lab NP who is aware of order. Patient with abnormal stress test. Spoke with cardiologist Dr. Dukes and spoke with cardiology fellow. Will provide consultation. Patient amenable to admission; endorsed to MAR. No active chest discomfort. Spoke with patient's PMD, Dr Metz- states to admit under Dr. Guadalupe.

## 2024-09-17 NOTE — ED CDU PROVIDER INITIAL DAY NOTE - PHYSICAL EXAMINATION
CONSTITUTIONAL: Well-developed; well-nourished; in no acute distress, nontoxic appearing  SKIN: skin exam is warm and dry  ENT: MMM   CARD: S1, S2 normal, no murmur  RESP: No wheezes, rales or rhonchi. Good air movement bilaterally  ABD: soft; non-distended; non-tender.    EXT: Normal ROM.    NEURO: awake, alert, following commands, oriented, grossly unremarkable. No Focal deficits. GCS 15.   PSYCH: Cooperative, appropriate. Doxepin Pregnancy And Lactation Text: This medication is Pregnancy Category C and it isn't known if it is safe during pregnancy. It is also excreted in breast milk and breast feeding isn't recommended.

## 2024-09-17 NOTE — ED CDU PROVIDER INITIAL DAY NOTE - OBJECTIVE STATEMENT
64 year old male, past medical history cad s/p 2 stents, htn, hdl, who presents with chest pain. patient with intermittent episodes of central chest pain described as pressure, non-exertional, non-pleuritic. patient reports last stent placed 2/2024, no recent cardiac workup. denies f/c, shortness of breath, cough, vomiting/diarrhea, leg pain/swelling.

## 2024-09-17 NOTE — H&P ADULT - HISTORY OF PRESENT ILLNESS
Patient is a 63y Male with PMH of CAD h/o NSTEMI with PCI 2018 to Ramus, PCI 2024 to prox LAD, HTN,, HLD, COPD, SOBEIDA (no longer needs CPAP), + family h/o CAD (father, sibling) who presented to the ED as per his Cardiologist (Dr. Dukes) for chest pain. Patient was working around the house when he suddenly developed deep substernal chest pain. It started right after he began working with a 5-gallon bucket of paint. The pain was not relieved with rest, worse with lying on left-side. Patient endorses the sensation felt similar to what he had in 2018 prior to being diagnosed with NSTEMI. Patient denies HA, sob, chills, n/v/c/d, hematuria, dysuria, abd pain, recent sick contacts.    In ED, vitals were bp 180/113, HR 79, RR 17, temp 98.4, satting 95% on room air  Labs- trops neg x2  CXR- no acute cardiopulmonary disease  EKG- no acute ischemic changes    Patient received a dose of ASA 81mg, Plavix 75mg and was sent for nuclear stress test.    Nuclear Stress Test (Lexiscan):  1. MODERATE SIZE REVERSIBLE DEFECT IN INFERIOR WALL OF THE LEFT VENTRICLE   CONSISTENT WITH ISCHEMIA.  2. NORMAL RESTING LEFT VENTRICULAR WALL MOTION AND WALL THICKENING.  3. LEFT VENTRICULAR EJECTION FRACTION OF  60 % WHICH IS WITHIN RANGE OF   NORMAL.    Patient was seen by Cardiology, plan for CC in am. He is being admitted for further management. Patient is a 64-year old Male with PMH of CAD h/o NSTEMI with PCI 2018 to Ramus, PCI 2024 to prox LAD, HTN, HLD, COPD, SOBEIDA (no longer needs CPAP), + family h/o CAD (father, sibling) who presented to the ED as per his Cardiologist (Dr. Dukes) for chest pain. Patient was working around the house when he suddenly developed deep substernal chest pain. It started right after he began working with a 5-gallon bucket of paint. The pain was not relieved with rest, worse with lying on left-side. Patient endorses the sensation felt similar to what he had in 2018 prior to being diagnosed with NSTEMI. Patient denies HA, sob, chills, n/v/c/d, hematuria, dysuria, abd pain, recent sick contacts.    In ED, vitals were bp 180/113, HR 79, RR 17, temp 98.4, satting 95% on room air  Labs- trops neg x2  CXR- no acute cardiopulmonary disease  EKG- no acute ischemic changes    Patient received a dose of ASA 81mg, Plavix 75mg and was sent for nuclear stress test.    Nuclear Stress Test (Lexiscan):  1. MODERATE SIZE REVERSIBLE DEFECT IN INFERIOR WALL OF THE LEFT VENTRICLE   CONSISTENT WITH ISCHEMIA.  2. NORMAL RESTING LEFT VENTRICULAR WALL MOTION AND WALL THICKENING.  3. LEFT VENTRICULAR EJECTION FRACTION OF  60 % WHICH IS WITHIN RANGE OF   NORMAL.    Patient was seen by Cardiology, plan for CC in am. He is being admitted for further management. Patient is a 64-year old Male with PMH of CAD h/o NSTEMI with PCI 2018 to Ramus, PCI 2024 to prox LAD, HTN, HLD, COPD, SOBEIDA (no longer needs CPAP), + family h/o CAD (father, sibling) who presented to the ED as per his Cardiologist (Dr. Dukes) for chest pain. The pain started 3 days ago. Patient was working around the house when he suddenly developed deep substernal chest pain. It started right after he began working with a 5-gallon bucket of paint. The pain was not relieved with rest, worse with lying on left-side. Patient endorses the sensation felt similar to what he had in 2018 prior to being diagnosed with NSTEMI. Patient denies HA, sob, chills, n/v/c/d, hematuria, dysuria, abd pain, recent sick contacts.    In ED, vitals were bp 180/113, HR 79, RR 17, temp 98.4, satting 95% on room air  Labs- trops neg x2  CXR- no acute cardiopulmonary disease  EKG- no acute ischemic changes    Patient received a dose of ASA 81mg, Plavix 75mg and was sent for nuclear stress test.    Nuclear Stress Test (Lexiscan):  1. MODERATE SIZE REVERSIBLE DEFECT IN INFERIOR WALL OF THE LEFT VENTRICLE   CONSISTENT WITH ISCHEMIA.  2. NORMAL RESTING LEFT VENTRICULAR WALL MOTION AND WALL THICKENING.  3. LEFT VENTRICULAR EJECTION FRACTION OF  60 % WHICH IS WITHIN RANGE OF   NORMAL.    Patient was seen by Cardiology, plan for CC in am. He is being admitted for further management.

## 2024-09-17 NOTE — ED CDU PROVIDER DISPOSITION NOTE - CLINICAL COURSE
a 64-year-old male with history of CAD status post 2 stents, last catheterization/stent in February, HTN, HLD, presents with chest pain. Reports left-sided chest pressure, nonexertional, nonpleuritic, x 3 days. Denies leg pain or swelling, shortness of breath, fever, vomiting, and all other symptoms. On exam, afebrile, hemodynamically stable, saturating well on room air, NAD, well appearing, sitting comfortably in bed, no WOB, speaking full sentences, head NCAT, EOMI grossly, anicteric, MMM, no JVD, RRR, nml S1/S2, no m/r/g, lungs CTAB, no w/r/r, abd soft, NT, ND, nml BS, no rebound or guarding, AAO, CN's 3-12 grossly intact, DEWEY spontaneously, no leg cyanosis or edema or calf tenderness, 2+ symmetric radial pulses, skin warm, well perfused, no rashes or hives. I reviewed labs, imaging, ECGs from ED course to this point. No abdominal pain or tenderness, with low suspicion for acute intra-abdominal process to warrant abdominal imaging. Character low suspicion for dissection and no murmur or pulse asymmetry to warrant CT angio imaging to rule out dissection. Character low suspicion for PE and no tachycardia, hypoxia, or e/o DVT or acute risk factors for this to warrant CT angio imaging to rule this out. No e/o PNA, PTX, or fluid overload on exam or CXR, and does not warrant antibiotics or diuretics at this time. Positive stress test. D/w cardio and admitted for further management.

## 2024-09-17 NOTE — ED CDU PROVIDER DISPOSITION NOTE - ATTENDING CONTRIBUTION TO CARE
64-year-old male with history of CAD status post 2 stents, last catheterization/stent in February, HTN, HLD, presents with chest pain. Reports left-sided chest pressure, nonexertional, nonpleuritic, x 3 days. Denies leg pain or swelling, shortness of breath, fever, vomiting, and all other symptoms. On exam, afebrile, hemodynamically stable, saturating well on room air, NAD, well appearing, sitting comfortably in bed, no WOB, speaking full sentences, head NCAT, EOMI grossly, anicteric, MMM, no JVD, RRR, nml S1/S2, no m/r/g, lungs CTAB, no w/r/r, abd soft, NT, ND, nml BS, no rebound or guarding, AAO, CN's 3-12 grossly intact, DEWEY spontaneously, no leg cyanosis or edema or calf tenderness, 2+ symmetric radial pulses, skin warm, well perfused, no rashes or hives. I reviewed labs, imaging, ECGs from ED course to this point. No abdominal pain or tenderness, with low suspicion for acute intra-abdominal process to warrant abdominal imaging. Character low suspicion for dissection and no murmur or pulse asymmetry to warrant CT angio imaging to rule out dissection. Character low suspicion for PE and no tachycardia, hypoxia, or e/o DVT or acute risk factors for this to warrant CT angio imaging to rule this out. No e/o PNA, PTX, or fluid overload on exam or CXR, and does not warrant antibiotics or diuretics at this time. Positive stress test. D/w cardio and admitted for further management.

## 2024-09-17 NOTE — CONSULT NOTE ADULT - ASSESSMENT
64 yr old m CAD s/p PCI (ramus 2018 and pLAD 02/2024), htn, SOBEIDA, HLD for whom current evaluation is most suggestive of  unstable angina with positive nuclear stress test.   Unclear eiology for cardiac chest pain at time. instent thrombosis vs restenosis?  vs non cardiac etiology    # CAD s/p PCI   # Unstable angina   # HTN   # HLD   # SOBEIDA     PLAN  - Keep NPO PMN for LHC   - Continue home meds asprin 81 mg and plavix 75 mg   - Start IV hydration with NS 75 cc/hr for 6 hrs pending cath   - Load with aspirin 321 mg before LHC possibly in the AM   - Resume home dose isosorbide ER 30 mg daily   - Resume home HTN medications top keep BP with goal   - DASH diet   - Obtain TTE     ** Please see attending attestation**    64 yr old m CAD s/p PCI (ramus 2018 and pLAD 02/2024), htn, SOBEIDA, HLD for whom current evaluation is most suggestive of  unstable angina with positive nuclear stress test.   Unclear eiology for cardiac chest pain at time. instent thrombosis vs restenosis?  vs non cardiac etiology    # CAD s/p PCI   # Unstable angina   # HTN   # HLD   # SOBEIDA     PLAN  - Keep NPO PMN for LHC   - Continue home meds asprin 81 mg and plavix 75 mg   - Start IV hydration with NS 75 cc/hr for 6 hrs pending cath   - Load with aspirin 321 mg before LHC possibly in the AM   - Resume home dose isosorbide ER 30 mg daily   - Resume home HTN medications top keep BP with goal   - DASH diet     ** Please see attending attestation**    64 yr old m CAD s/p PCI (ramus 2018 and pLAD 02/2024), htn, SOBEIDA, HLD for whom current evaluation is most suggestive of  unstable angina with positive nuclear stress test.   Unclear eiology for cardiac chest pain at this time. instent thrombosis vs restenosis?  vs non cardiac etiology    # CAD s/p PCI   # Unstable angina   # HTN   # HLD   # SOBEIDA     PLAN  - Keep NPO PMN for LHC   - Continue home meds asprin 81 mg and plavix 75 mg   - Start IV hydration with NS 75 cc/hr for 6 hrs pending cath   - Load with aspirin 321 mg before LHC possibly in the AM   - Resume home dose isosorbide ER 30 mg daily   - Resume home HTN medications to keep BP within goal   - DASH diet     ** Please see attending attestation**

## 2024-09-17 NOTE — H&P ADULT - ASSESSMENT
Patient is a 64-year old Male with PMH of CAD h/o NSTEMI with PCI 2018 to Ramus, PCI 2024 to prox LAD, HTN, HLD, COPD, SOBEIDA (no longer needs CPAP), + family h/o CAD (father, sibling) who presented to the ED as per his Cardiologist (Dr. Dukes) for chest pain. Found to have a positive stress test. Is being admitted for further management, likely CC in am.       Patient is a 64-year old Male with PMH of CAD h/o NSTEMI with PCI 2018 to Ramus, PCI 2024 to prox LAD, HTN, HLD, COPD, SOBEIDA (no longer needs CPAP), + family h/o CAD (father, sibling) who presented to the ED as per his Cardiologist (Dr. Dukes) for chest pain. Found to have a positive stress test. Is being admitted for further management, likely CC in am.      #Unstable Angina  #Hx of CAD s/p stents x2 (Ramus 2018, proxLAD 2024)  - pt with DAV score of 4, on ASA regularly, given plavix 75mg in hospital (stopped 3 days ago when pain started)  - c/w ASA 81mg qd, plavix 75mg qd for now  - load with ASA 325mg prior to LHC in am  - will make NPO am, gentle hydration 75cc/hr starting at [place]  - will order TTE, TSH, A1C, lipid profile    #HTN  #HLD  - on benzapril 10mgqd  - c/w doxazosin 4mg po qd  - c/w hctz 25mg po qd  - c/w imdur ER 30mg po qd  - on toprol 25mg qd>> will change to lopressor 12.5mg bid while in hospital  - c/w fenofibrate 160mg qd  - c/w ezetimibe 10mg qd  - c/w pravastatin 10mg qd    #COPD  #SOBEIDA  - currently asx, on room air, comfortable  - c/w albuterol nebs q6 prn for sob and/or wheezing    #Gout  - c/w allopurinol 300mg qd    # Misc  - GI ppx: pepcid    - DVT ppx: heparin  - Diet: DASH, NPO am  - Activity: IAT  - DISPO: acute, pending cath    CODE STATUS:              Patient is a 64-year old Male with PMH of CAD h/o NSTEMI with PCI 2018 to Ramus, PCI 2024 to prox LAD, HTN, HLD, COPD, SOBEIDA (no longer needs CPAP), + family h/o CAD (father, sibling) who presented to the ED as per his Cardiologist (Dr. Dukes) for chest pain. Found to have a positive stress test. Is being admitted for further management, likely CC in am.      #Unstable Angina  #Hx of CAD s/p stents x2 (Ramus 2018, proxLAD 2024)  - pt with DAV score of 4, on ASA regularly, given plavix 75mg in hospital (stopped 3 days ago when pain started)  - c/w ASA 81mg qd, plavix 75mg qd for now  - load with ASA 325mg prior to LHC in am  - will make NPO am, gentle hydration 75cc/hr starting at [place]n  - will order TTE, TSH, A1C, lipid profile    #HTN  #HLD  - on benzapril 10mg qd>> lisinopril equivalent of 10mg qd  - c/w doxazosin 4mg po qd  - c/w hctz 25mg po qd  - c/w imdur ER 30mg po qd  - on toprol 25mg qd>> will change to lopressor 12.5mg bid while in hospital  - c/w fenofibrate 160mg qd  - c/w ezetimibe 10mg qd  - on pravastatin 10mg qd>> lipitor 40mg qhs for now    #COPD  #SOBEIDA  - currently asx, on room air, comfortable  - c/w albuterol nebs q6 prn for sob and/or wheezing    #Gout  - c/w allopurinol 300mg qd    # Misc  - GI ppx: pepcid    - DVT ppx: heparin  - Diet: DASH, NPO am  - Activity: IAT  - DISPO: acute, pending cath    CODE STATUS: NICKY

## 2024-09-17 NOTE — CONSULT NOTE ADULT - ATTENDING COMMENTS
64yoM with CAD s/p PCI to RI (2018) and pLAD (2/2024) who presented with chest pain and dyspnea x 3 days, similar to his prior presentations requiring stents. Lexiscan NST with inferior ischemia. Plan for [facility] tomorrow. See above for recommendations.

## 2024-09-17 NOTE — H&P ADULT - NSHPREVIEWOFSYSTEMS_GEN_ALL_CORE
REVIEW OF SYSTEMS:  CONSTITUTIONAL: No weakness, fevers or chills  EYES/ENT: No visual changes;  No vertigo or throat pain   NECK: No pain or stiffness  RESPIRATORY: No cough, wheezing, hemoptysis; No shortness of breath  CARDIOVASCULAR: intermittent chest pain  GASTROINTESTINAL: No abdominal or epigastric pain. No nausea, vomiting, or hematemesis; No diarrhea or constipation. No melena or hematochezia.  GENITOURINARY: No dysuria, frequency or hematuria  NEUROLOGICAL: No numbness or weakness  SKIN: No itching, rashes

## 2024-09-18 VITALS
HEART RATE: 60 BPM | RESPIRATION RATE: 16 BRPM | SYSTOLIC BLOOD PRESSURE: 108 MMHG | TEMPERATURE: 98 F | DIASTOLIC BLOOD PRESSURE: 66 MMHG | OXYGEN SATURATION: 95 %

## 2024-09-18 PROCEDURE — 93458 L HRT ARTERY/VENTRICLE ANGIO: CPT | Mod: 26

## 2024-09-18 RX ORDER — SODIUM CHLORIDE 9 MG/ML
1000 INJECTION INTRAMUSCULAR; INTRAVENOUS; SUBCUTANEOUS
Refills: 0 | Status: DISCONTINUED | OUTPATIENT
Start: 2024-09-18 | End: 2024-09-19

## 2024-09-18 RX ORDER — SODIUM CHLORIDE 9 MG/ML
1000 INJECTION INTRAMUSCULAR; INTRAVENOUS; SUBCUTANEOUS
Refills: 0 | Status: DISCONTINUED | OUTPATIENT
Start: 2024-09-18 | End: 2024-09-18

## 2024-09-18 RX ORDER — NITROGLYCERIN 0.2MG/HR
0.4 PATCH, TRANSDERMAL 24 HOURS TRANSDERMAL ONCE
Refills: 0 | Status: COMPLETED | OUTPATIENT
Start: 2024-09-18 | End: 2024-09-18

## 2024-09-18 RX ORDER — ASPIRIN 81 MG
324 TABLET, DELAYED RELEASE (ENTERIC COATED) ORAL ONCE
Refills: 0 | Status: DISCONTINUED | OUTPATIENT
Start: 2024-09-18 | End: 2024-09-18

## 2024-09-18 RX ORDER — KETOROLAC TROMETHAMINE 30 MG/ML
15 INJECTION, SOLUTION INTRAMUSCULAR ONCE
Refills: 0 | Status: DISCONTINUED | OUTPATIENT
Start: 2024-09-18 | End: 2024-09-18

## 2024-09-18 RX ORDER — ASPIRIN 81 MG
325 TABLET, DELAYED RELEASE (ENTERIC COATED) ORAL ONCE
Refills: 0 | Status: COMPLETED | OUTPATIENT
Start: 2024-09-18 | End: 2024-09-18

## 2024-09-18 RX ORDER — SODIUM CHLORIDE 9 MG/ML
250 INJECTION INTRAMUSCULAR; INTRAVENOUS; SUBCUTANEOUS ONCE
Refills: 0 | Status: COMPLETED | OUTPATIENT
Start: 2024-09-18 | End: 2024-09-18

## 2024-09-18 RX ADMIN — Medication 75 MILLIGRAM(S): at 11:48

## 2024-09-18 RX ADMIN — Medication 300 MILLIGRAM(S): at 11:48

## 2024-09-18 RX ADMIN — KETOROLAC TROMETHAMINE 15 MILLIGRAM(S): 30 INJECTION, SOLUTION INTRAMUSCULAR at 22:35

## 2024-09-18 RX ADMIN — SODIUM CHLORIDE 150 MILLILITER(S): 9 INJECTION INTRAMUSCULAR; INTRAVENOUS; SUBCUTANEOUS at 19:08

## 2024-09-18 RX ADMIN — Medication 0.4 MILLIGRAM(S): at 09:15

## 2024-09-18 RX ADMIN — METOPROLOL TARTRATE 12.5 MILLIGRAM(S): 100 TABLET ORAL at 20:12

## 2024-09-18 RX ADMIN — LISINOPRIL 10 MILLIGRAM(S): 10 TABLET ORAL at 06:26

## 2024-09-18 RX ADMIN — ISOSORBIDE MONONITRATE 30 MILLIGRAM(S): 30 TABLET, EXTENDED RELEASE ORAL at 11:48

## 2024-09-18 RX ADMIN — SODIUM CHLORIDE 75 MILLILITER(S): 9 INJECTION INTRAMUSCULAR; INTRAVENOUS; SUBCUTANEOUS at 02:52

## 2024-09-18 RX ADMIN — FAMOTIDINE 20 MILLIGRAM(S): 10 INJECTION INTRAVENOUS at 11:48

## 2024-09-18 RX ADMIN — Medication 40 MILLIGRAM(S): at 21:03

## 2024-09-18 RX ADMIN — Medication 5000 UNIT(S): at 06:26

## 2024-09-18 RX ADMIN — SODIUM CHLORIDE 75 MILLILITER(S): 9 INJECTION INTRAMUSCULAR; INTRAVENOUS; SUBCUTANEOUS at 15:21

## 2024-09-18 RX ADMIN — SODIUM CHLORIDE 500 MILLILITER(S): 9 INJECTION INTRAMUSCULAR; INTRAVENOUS; SUBCUTANEOUS at 15:17

## 2024-09-18 RX ADMIN — Medication 325 MILLIGRAM(S): at 01:55

## 2024-09-18 RX ADMIN — ACETAMINOPHEN 400 MILLIGRAM(S): 325 TABLET ORAL at 00:34

## 2024-09-18 RX ADMIN — Medication 4 MILLIGRAM(S): at 21:03

## 2024-09-18 RX ADMIN — FENOFIBRATE 48 MILLIGRAM(S): 145 TABLET, FILM COATED ORAL at 12:57

## 2024-09-18 RX ADMIN — METOPROLOL TARTRATE 12.5 MILLIGRAM(S): 100 TABLET ORAL at 06:26

## 2024-09-18 RX ADMIN — EZETIMIBE 10 MILLIGRAM(S): 10 TABLET ORAL at 11:48

## 2024-09-18 NOTE — PATIENT PROFILE ADULT - FALL HARM RISK - UNIVERSAL INTERVENTIONS
Bed in lowest position, wheels locked, appropriate side rails in place/Call bell, personal items and telephone in reach/Instruct patient to call for assistance before getting out of bed or chair/Non-slip footwear when patient is out of bed/[place] to call system/Physically safe environment - no spills, clutter or unnecessary equipment/Purposeful Proactive Rounding/Room/bathroom lighting operational, light cord in reach

## 2024-09-18 NOTE — PATIENT PROFILE ADULT - NSPROPTRIGHTSUPPORTPERSON_GEN_A_NUR
Group Topic: BH Process Group     Date: 1/11/2021  Start Time: 1315  End Time: 1400  Facilitators: Tierra Galo LCSW    Focus: Process  Number in attendance: 1      Method: Group  Attendance: Present  Participation: Minimal  Patient Response: Appeared distracted, Appropriate feedback, Awareness of social/physical boundaries, Impaired concentration, Poor eye contact and Select interactions  Mood: Depressed  Affect: Type: Depressed   Range: Blunted/flat   Congruency: Congruent   Stability: Stable  Behavior/Socialization: Appropriate to group and Guarded  Thought Process: Circumstantial and Tracking  Task Performance: Follows directions, Needs clarification and Needs cueing  Patient Evaluation: Encouragement - needs prompts   The patient shared how he lives with his parents, but would like to move out. Patient does not have a job and no money saved to fund this goal. Daily routine includes eating 3 meals a day, watching TV, completing chores and assisting with dinner preparation. Patients parents are gone at work daily. \"Sometimes I take walks.\" Patient shared how he has one younger sister who lives on her own. Patient declined to share further information. Tierra Galo LCSW [facility]     Patient yes

## 2024-09-18 NOTE — CHART NOTE - NSCHARTNOTEFT_GEN_A_CORE
PROCEDURE: [facility]    PRIMARY PHYSICIAN:  Dr. Martin  FELLOW: Nicole      PROCEDURE DESCRIPTION:   Anesthesia: Local + Sedation     Access: R radial A  Closure: D stat    IV Contrast:  30 mL      ESTIMATED BLOOD LOSS: <10cc    SPECIMENS REMOVED: None    FINDINGS:   DOMINANCE: Co dominant    LM: Mild disease  LAD: Patent stent in proximal segment, mild disease diffusely  D1: Mild disease  CX: Mild disease  Ramus: Patent stent, mild disease  RCA: Mild disease diffusely  RPDA: Mild disease    LVEDP:  18 mmHg     POST-OP DIAGNOSIS:  Mild Coronary Artery Disease  Patent stents in LAD and Ramus       PLAN OF CARE:   [x] Post-procedure LVEDP-guided IV fluids: 100 cc/hr x 2 hrs  [x] Medications:   - C/w DAPT, Metoprolol, Imdur    DISPOSITION:  [x] Return to In-patient bed PROCEDURE: [facility]    PRIMARY PHYSICIAN:  Dr. Martin  FELLOW: Nicole      PROCEDURE DESCRIPTION:   Anesthesia: Local + Sedation     Access: R radial A  Closure: D stat    IV Contrast:  30 mL      ESTIMATED BLOOD LOSS: <10cc    SPECIMENS REMOVED: None    FINDINGS:   DOMINANCE: Co dominant    LM: Mild disease  LAD: Patent stent in proximal segment, mild disease diffusely  D1: Mild disease  CX: Mild disease  Ramus: Patent stent, mild disease  RCA: Mild disease diffusely  RPDA: Mild disease    LVEDP:  18 mmHg     POST-OP DIAGNOSIS:  Mild Coronary Artery Disease  Patent stents in LAD and Ramus       PLAN OF CARE:   [x] Post-procedure LVEDP-guided IV fluids: 150 cc/hr x 2 hrs  [x] Medications:   - C/w DAPT, Metoprolol, Imdur    DISPOSITION:  [x] Return to In-patient bed PROCEDURE: [facility]    PRIMARY PHYSICIAN:  Dr. Martin  FELLOW: Nicole      PROCEDURE DESCRIPTION:   Anesthesia: Local + Sedation     Access: R radial A  Closure: D stat    IV Contrast:  30 mL      ESTIMATED BLOOD LOSS: <10cc    SPECIMENS REMOVED: None    FINDINGS:   DOMINANCE: Co dominant    LM: Mild disease  LAD: Patent stent in proximal segment, mild disease diffusely  D1: Mild disease  CX: Mild disease  Ramus: Patent stent, mild disease  RCA: Mild disease diffusely  RPDA: Mild disease    LVEDP:  18 mmHg     POST-OP DIAGNOSIS:  Mild Coronary Artery Disease  Patent stents in LAD and Ramus       PLAN OF CARE:   [x] Post-procedure LVEDP-guided IV fluids: 150 cc/hr x 4 hrs  [x] Medications:   - C/w DAPT, Metoprolol, Imdur    DISPOSITION:  [x] Return to In-patient bed

## 2024-09-18 NOTE — CHART NOTE - NSCHARTNOTEFT_GEN_A_CORE
PREOPERATIVE DAY OF PROCEDURE EVALUATION:  I have personally seen and examined the patient.  I agree with the history and physical which I have reviewed and noted any changes below.  (Signed electronically by __________)  09-18-24 @ 14:53    64-year old Male with PMH of CAD h/o NSTEMI with PCI 1/12/2018 to Ramus( 3.5x 12 SYnergy), PCI 2024(Synergy 3.5 X 16 to prox LAD), HTN, HLD, COPD, SOBEIDA (no longer needs CPAP), + family h/o CAD (father, sibling) who presented to the ED as per his Cardiologist (Dr. Dukes) for chest pain. The pain started 3 days ago. Patient was working around the house when he suddenly developed deep substernal chest pain. It started right after he began working with a 5-gallon bucket of paint. The pain was not relieved with rest, worse with lying on left-side. Patient endorses the sensation felt similar to what he had in 2018 prior to being diagnosed with NSTEMI. Patient denies HA, sob, chills, n/v/c/d, hematuria, dysuria, abd pain, recent sick contacts Patient presnts to cardiology department for LHC with possible intervention    Nuclear Stress Test (Lexiscan): 9/17/24  1. MODERATE SIZE REVERSIBLE DEFECT IN INFERIOR WALL OF THE LEFT VENTRICLE   CONSISTENT WITH ISCHEMIA.  2. NORMAL RESTING LEFT VENTRICULAR WALL MOTION AND WALL THICKENING.  3. LEFT VENTRICULAR EJECTION FRACTION OF  60 % WHICH IS WITHIN RANGE OF   NORMAL.      Cath 2/1/24  Intervention: Successful IVUS guided cutting balloon angioplasty with ZAIDA X 1 of prox LAD 70% calcified stenosis FFR positive 0.73  Implants: Synergy 3.5 X 16     FINDINGS:   Coronary Dominance: Co Dominant System    LM: Mild disease    LAD: Prox LAD 70% calcified stenosis FFR positive 0.73 s/p intervention  D1 mild disease    CX: mild disease  OM1 mild disease    Ramus: Mild disease, patent prior stent    RCA: Mild disease  RPDA Mild disease      LVEDP: 19 mmHg         Cath Bleeding Risk: 1.2%    Prehydration:                             (x )  ml bolus x 1 hr prior to cardiac cath followed by                           ( x) 75cc/hr until procedure                           ( ) 50cc/hr until procedure                          ( ) no fluids d/t    Antianginals: Imdur, BB                      Right Johny test:

## 2024-09-19 ENCOUNTER — TRANSCRIPTION ENCOUNTER (OUTPATIENT)
Age: 64
End: 2024-09-19

## 2024-09-19 ENCOUNTER — RESULT REVIEW (OUTPATIENT)
Age: 64
End: 2024-09-19

## 2024-09-19 PROCEDURE — 93306 TTE W/DOPPLER COMPLETE: CPT | Mod: 26

## 2024-09-19 RX ADMIN — Medication 300 MILLIGRAM(S): at 13:28

## 2024-09-19 RX ADMIN — Medication 5000 UNIT(S): at 05:35

## 2024-09-19 RX ADMIN — LISINOPRIL 10 MILLIGRAM(S): 10 TABLET ORAL at 05:35

## 2024-09-19 RX ADMIN — Medication 81 MILLIGRAM(S): at 13:29

## 2024-09-19 RX ADMIN — FAMOTIDINE 20 MILLIGRAM(S): 10 INJECTION INTRAVENOUS at 13:29

## 2024-09-19 RX ADMIN — EZETIMIBE 10 MILLIGRAM(S): 10 TABLET ORAL at 13:28

## 2024-09-19 RX ADMIN — METOPROLOL TARTRATE 12.5 MILLIGRAM(S): 100 TABLET ORAL at 05:35

## 2024-09-19 RX ADMIN — FENOFIBRATE 48 MILLIGRAM(S): 145 TABLET, FILM COATED ORAL at 13:30

## 2024-09-19 RX ADMIN — Medication 75 MILLIGRAM(S): at 13:28

## 2024-09-19 RX ADMIN — ISOSORBIDE MONONITRATE 30 MILLIGRAM(S): 30 TABLET, EXTENDED RELEASE ORAL at 13:28

## 2024-09-19 NOTE — DISCHARGE NOTE PROVIDER - NSDCCPCAREPLAN_GEN_ALL_CORE_FT
PRINCIPAL DISCHARGE DIAGNOSIS  Diagnosis: Abnormal nuclear stress test  Assessment and Plan of Treatment: You were admitted because of chest pain. You had a positive stress test. You underwent left heart catheterization procedure which showed no significiant abnormalities of the arteries and patent stent.  After discharge, please take medications as prescribed. Please follow up with primary care doctor and cardiologist.      SECONDARY DISCHARGE DIAGNOSES  Diagnosis: Chest pain  Assessment and Plan of Treatment:     Diagnosis: Coronary artery disease  Assessment and Plan of Treatment:

## 2024-09-19 NOTE — PROGRESS NOTE ADULT - ASSESSMENT
Patient is a 64-year old Male with PMH of CAD h/o NSTEMI with PCI 2018 to Ramus, PCI 2024 to prox LAD, HTN, HLD, COPD, SOBEIDA (no longer needs CPAP), + family h/o CAD (father, sibling) who presented to the ED as per his Cardiologist (Dr. Dukes) for chest pain. Found to have a positive stress test. Is being admitted for further management, likely CC in am.      Plan    #Unstable Angina  #Hx of CAD s/p stents x2 (Ramus 2018, proxLAD 2024)  - pt with DAV score of 4, on ASA regularly, given plavix 75mg in hospital (stopped 3 days ago when pain started)  - c/w ASA 81mg qd, plavix 75mg qd for now  - load with ASA 325mg prior to LHC in am  - LHC today 8/18  - f/u TTE  - f/u TSH A1C    #HTN  #HLD  - on benzapril 10mg qd>> lisinopril equivalent of 10mg qd  - c/w doxazosin 4mg po qd  - c/w hctz 25mg po qd  - c/w imdur ER 30mg po qd  - on toprol 25mg qd>> will change to lopressor 12.5mg bid while in hospital  - c/w fenofibrate 160mg qd  - c/w ezetimibe 10mg qd  - on pravastatin 10mg qd>> lipitor 40mg qhs for now    #COPD  #SOBEIDA  - currently asx, on room air, comfortable  - c/w albuterol nebs q6 prn for sob and/or wheezing    #Gout  - c/w allopurinol 300mg qd    # Misc  - GI ppx: pepcid    - DVT ppx: heparin  - Diet: DASH, NPO am  - Activity: IAT  - DISPO: acute, pending cath  
64 YOWM with prior CAD and NSTEMI 2018 and 2024, sp stent ad with retrosternal CP, + Nuc stress test for ischemia and underwent cardiac cath today with mild CAD with recommndations for medical therapy    CP, unstable angina  + Nuc stress test  Cardiac Cath R radial approach, patent stents, mild diff dis c/w mild CAD  pt ad to 3C  dual APLT, metoprolol and imdur  Cardio  ff up with Dr Dukes    Hx of HTN, ASHD, prior CAD, NSTEMI, 2018, PCI, stent 2024  cont meds  Hx of DLD  cont atorvastatin 40mg, att to diet  Hx of COPD, SOBEIDA  Hx of GERD  PPI  Social SVC, prepare for safe D/C   
64 YOWM with prior CAD and NSTEMI 2018 and 2024, sp stent ad with retrosternal CP, + Nuc stress test for ischemia and underwent cardiac cath today with mild CAD with recommndations for medical therapy    CP, unstable angina  + Nuc stress test  Cardiac Cath R radial approach, patent stents, mild diff dis c/w mild CAD  pt ad to 3C  dual APLT, Plavix 75mg and 30mg,  metoprolol  25mg and imdur 30mg  Cardio  ff up with Dr Dukes    Hx of HTN, ASHD, prior CAD, NSTEMI, 2018, PCI, stent 2024  cont meds  Hx of DLD  cont atorvastatin 40mg, att to diet  Hx of COPD, SOBEIDA  Hx of GERD  PPI  Social SVC,  medicaly stable for D/C today

## 2024-09-19 NOTE — DISCHARGE NOTE NURSING/CASE MANAGEMENT/SOCIAL WORK - PATIENT PORTAL LINK FT
You can access the FollowMyHealth Patient Portal offered by [facility] by registering at the following website: [email or web address]. By joining [facility]’s FollowMyHealth portal, you will also be able to view your health information using other applications (apps) compatible with our system.
N/A

## 2024-09-19 NOTE — DISCHARGE NOTE PROVIDER - CARE PROVIDER_API CALL
Ty Metz.  Internal Medicine  [address] [facility] [address]  [place] [address]  Phone: [phone]  Fax: [phone]  Follow Up Time: 1 week

## 2024-09-19 NOTE — PROGRESS NOTE ADULT - SUBJECTIVE AND OBJECTIVE BOX
MIKAL OLIVAS 64y Male brought to the ER for c/o CP, retrosternal, tightness, heaviness inc x 4 days with exacerbation on day of ad after working around the house with a 5 gal paint can.  The pt has complex prior CAD, NSTEMI and PCI, stent and states the pain felt similar to prior event.  The pt denies fever, chills, SOB, palpitations, N/V. The pt had  trop 12, CXRclear, EKG NSR 65 with nonspecific  ST-T changes and a + Nuc Stress test for moderate size reversible defect in inf wall of L ventricle c/w ischemia.  The pt was prepared for cardiac cath on 9/18.      PMHX includes:  HTN, ASHD, CAD, NSTEMI 2018, PCI, stent 2024, DLD, COPD, SOBEIDA, OA, gout, prostatism, GERD, diverticulosis  Cardio DR DAS    INTERVAL HPI/OVERNIGHT EVENTS: pt on 3C post cardiac cath earlier today via R radial approach, patent L stent prox LAD, patent Ramus stent, mild dis in other aa c/w mild CAD, recommend DAPT, metoprolol and Imdur    MEDICATIONS  (STANDING):  allopurinol 300 milliGRAM(s) Oral daily  aspirin enteric coated 81 milliGRAM(s) Oral daily  atorvastatin 40 milliGRAM(s) Oral at bedtime  clopidogrel Tablet 75 milliGRAM(s) Oral daily  doxazosin 4 milliGRAM(s) Oral at bedtime  ezetimibe 10 milliGRAM(s) Oral daily  famotidine    Tablet 20 milliGRAM(s) Oral daily  fenofibrate Tablet 48 milliGRAM(s) Oral daily  heparin   Injectable 5000 Unit(s) SubCutaneous every 12 hours  hydrochlorothiazide 25 milliGRAM(s) Oral daily  isosorbide   mononitrate ER Tablet (IMDUR) 30 milliGRAM(s) Oral daily  ketorolac   Injectable 15 milliGRAM(s) IV Push Once  lisinopril 10 milliGRAM(s) Oral daily  metoprolol tartrate 12.5 milliGRAM(s) Oral two times a day  sodium chloride 0.9%. 1000 milliLiter(s) (75 mL/Hr) IV Continuous <Continuous>  sodium chloride 0.9%. 1000 milliLiter(s) (150 mL/Hr) IV Continuous <Continuous>  sodium chloride 0.9%. 1000 milliLiter(s) (75 mL/Hr) IV Continuous <Continuous>    MEDICATIONS  (PRN):  albuterol    0.083% 2.5 milliGRAM(s) Nebulizer every 6 hours PRN Shortness of Breath and/or Wheezing      Allergies    No Known Allergies    Intolerances        REVIEW OF SYSTEMS      General: gen weakness		    Respiratory and Thorax: denies cough SOB  	  Cardiovascular:	CP/pressure/tightness    Vital Signs Last 24 Hrs  T(C): 36.6 (18 Sep 2024 20:09), Max: 36.9 (18 Sep 2024 15:10)  T(F): 97.8 (18 Sep 2024 20:09), Max: 98.4 (18 Sep 2024 15:10)  HR: 60 (18 Sep 2024 20:09) (60 - 74)  BP: 108/66 (18 Sep 2024 20:09) (97/58 - 130/76)  BP(mean): --  RR: 16 (18 Sep 2024 20:09) (16 - 18)  SpO2: 95% (18 Sep 2024 20:09) (95% - 98%)    Parameters below as of 18 Sep 2024 20:09  Patient On (Oxygen Delivery Method): room air        PHYSICAL EXAM:      Constitutional: A&O, comfortable and NAD    Eyes: nonicteric    ENMT: moist     Neck: supple, no JVD, no bruit, no stridor    Respiratory: shallow resp, no crackles, rales, wheezing    Cardiovascular: S1S2 reg    Gastrointestinal: globose soft and benign, no organomegaly    Genitourinary: no matthew    Extremities: moves all ext, mild arthritic changes, no edema    Vascular: + pedal pulses    Neurological: nonfocal    Skin: no rash    Lymph Nodes: not enlarged    Musculoskeletal: nl mm mass and tone    Psychiatric: stable        LABS:    9/16  WBC 8.5  H/H 14/43  Plts 241  GFR 68  Na 147, K 4.3, Cl 106, CO2 31, BUN/creat 33/1.2 glu 95  LFTs WNL  troponin 12      RADIOLOGY & ADDITIONAL TESTS:  CXR:  clear  EKG:  NSR 65/min non specific ST-T changes  Nuc stress test:  moderate size reversible defect in inf wall of L ventricle c/w ischemia, EF 60%    Cardiac cath R radial:  LAD patent stent prox segment, mild diffuse dis, D1 mild dis, CX mild dis, Ramus patent stent, RCA mild diffuse dis, RPPDA mild dis, Imp:  mild CAD, recommend DAPT, metoprolol and imdur  
  MIKAL OLIVAS 64y Male brought to the ER for c/o CP, retrosternal, tightness, heaviness inc x 4 days with exacerbation on day of ad after working around the house with a 5 gal paint can.  The pt has complex prior CAD, NSTEMI and PCI, stent and states the pain felt similar to prior event.  The pt denies fever, chills, SOB, palpitations, N/V. The pt had  trop 12, CXRclear, EKG NSR 65 with nonspecific  ST-T changes and a + Nuc Stress test for moderate size reversible defect in inf wall of L ventricle c/w ischemia.  The pt was prepared for cardiac cath on 9/18.      PMHX includes:  HTN, ASHD, CAD, NSTEMI 2018, PCI, stent 2024, DLD, COPD, SOBEIDA, OA, gout, prostatism, GERD, diverticulosis  Cardio DR DAS    INTERVAL HPI/OVERNIGHT EVENTS: pt on 3C post cardiac cath yeserday via R radial approach, patent L stent prox LAD, patent Ramus stent, mild dis in other aa c/w mild CAD, recommend DAPT,  (Plavix/ASA) metoprolol R 25 and Imdur 30mg, med stable fr D/C    MEDICATIONS  (STANDING):  allopurinol 300 milliGRAM(s) Oral daily  aspirin enteric coated 81 milliGRAM(s) Oral daily  atorvastatin 40 milliGRAM(s) Oral at bedtime  clopidogrel Tablet 75 milliGRAM(s) Oral daily  doxazosin 4 milliGRAM(s) Oral at bedtime  ezetimibe 10 milliGRAM(s) Oral daily  famotidine    Tablet 20 milliGRAM(s) Oral daily  fenofibrate Tablet 48 milliGRAM(s) Oral daily  heparin   Injectable 5000 Unit(s) SubCutaneous every 12 hours  hydrochlorothiazide 25 milliGRAM(s) Oral daily  isosorbide   mononitrate ER Tablet (IMDUR) 30 milliGRAM(s) Oral daily  ketorolac   Injectable 15 milliGRAM(s) IV Push Once  lisinopril 10 milliGRAM(s) Oral daily  metoprolol tartrate 12.5 milliGRAM(s) Oral two times a day  sodium chloride 0.9%. 1000 milliLiter(s) (75 mL/Hr) IV Continuous <Continuous>  sodium chloride 0.9%. 1000 milliLiter(s) (150 mL/Hr) IV Continuous <Continuous>  sodium chloride 0.9%. 1000 milliLiter(s) (75 mL/Hr) IV Continuous <Continuous>    MEDICATIONS  (PRN):  albuterol    0.083% 2.5 milliGRAM(s) Nebulizer every 6 hours PRN Shortness of Breath and/or Wheezing      Allergies    No Known Allergies    Intolerances        REVIEW OF SYSTEMS      General: gen weakness		    Respiratory and Thorax: denies cough SOB  	  Cardiovascular:	CP/pressure/tightness    Vital Signs Last 24 Hrs    T(F): 97.8   HR: 62   BP: 110/65  BP(mean): --  RR: 18  SpO2: 97% RA        PHYSICAL EXAM:      Constitutional: A&O, sitting OOB in chair,  comfortable and NAD    Eyes: nonicteric    ENMT: moist     Neck: supple, no JVD, no bruit, no stridor    Respiratory: shallow resp, no crackles, rales, wheezing    Cardiovascular: S1S2 reg    Gastrointestinal: globose  and oese, soft and benign, no organomegaly    Genitourinary: no matthew    Extremities: moves all ext, mild arthritic changes, no edema    Vascular: + pedal pulses    Neurological: nonfocal    Skin: no rash    Lymph Nodes: not enlarged    Musculoskeletal: nl mm mass and tone    Psychiatric: stable        LABS:    9/16  WBC 8.5  H/H 14/43  Plts 241  GFR 68  Na 147, K 4.3, Cl 106, CO2 31, BUN/creat 33/1.2 glu 95  LFTs WNL  troponin 12      RADIOLOGY & ADDITIONAL TESTS:  CXR:  clear  EKG:  NSR 65/min non specific ST-T changes  Nuc stress test:  moderate size reversible defect in inf wall of L ventricle c/w ischemia, EF 60%    Cardiac cath R radial:  LAD patent stent prox segment, mild diffuse dis, D1 mild dis, CX mild dis, Ramus patent stent, RCA mild diffuse dis, RPPDA mild dis, Imp:  mild CAD, recommend DAPT, metoprolol and imdur  
SUBJECTIVE/OVERNIGHT EVENTS    HOSPITAL COURSE    Patient is a 64-year old Male with PMH of CAD h/o NSTEMI with PCI 2018 to Ramus, PCI 2024 to prox LAD, HTN, HLD, COPD, SOBEIDA (no longer needs CPAP), + family h/o CAD (father, sibling) who presented to the ED as per his Cardiologist (Dr. Dukes) for chest pain. The pain started 3 days ago. Patient was working around the house when he suddenly developed deep substernal chest pain. It started right after he began working with a 5-gallon bucket of paint. The pain was not relieved with rest, worse with lying on left-side. Patient endorses the sensation felt similar to what he had in 2018 prior to being diagnosed with NSTEMI. Patient denies HA, sob, chills, n/v/c/d, hematuria, dysuria, abd pain, recent sick contacts.      Today is hospital day 1d. This morning patient was seen and examined at bedside, resting comfortably in bed. No acute or major events overnight.      CODE STATUS:    FAMILY COMMUNICATION  Contact date:  Name of person contacted:  Relationship to patient:  Communication details:    MEDICATIONS  STANDING MEDICATIONS  allopurinol 300 milliGRAM(s) Oral daily  aspirin enteric coated 81 milliGRAM(s) Oral daily  atorvastatin 40 milliGRAM(s) Oral at bedtime  clopidogrel Tablet 75 milliGRAM(s) Oral daily  doxazosin 4 milliGRAM(s) Oral at bedtime  ezetimibe 10 milliGRAM(s) Oral daily  famotidine    Tablet 20 milliGRAM(s) Oral daily  fenofibrate Tablet 48 milliGRAM(s) Oral daily  heparin   Injectable 5000 Unit(s) SubCutaneous every 12 hours  hydrochlorothiazide 25 milliGRAM(s) Oral daily  isosorbide   mononitrate ER Tablet (IMDUR) 30 milliGRAM(s) Oral daily  lisinopril 10 milliGRAM(s) Oral daily  metoprolol tartrate 12.5 milliGRAM(s) Oral two times a day  sodium chloride 0.9%. 1000 milliLiter(s) IV Continuous <Continuous>    PRN MEDICATIONS  albuterol    0.083% 2.5 milliGRAM(s) Nebulizer every 6 hours PRN    VITALS  T(F): 97.8 (09-18-24 @ 05:16), Max: 99.1 (09-17-24 @ 15:55)  HR: 74 (09-18-24 @ 09:08) (61 - 74)  BP: 119/74 (09-18-24 @ 09:08) (119/74 - 157/90)  RR: 18 (09-18-24 @ 05:16) (18 - 18)  SpO2: 98% (09-17-24 @ 22:01) (97% - 98%)    PHYSICAL EXAM  GENERAL  (x  ) NAD, lying in bed comfortably     (  ) obtunded     (  ) lethargic     (  ) somnolent    HEAD  ( x ) Atraumatic     (  ) hematoma     (  ) laceration (specify location:       )     NECK  (  ) Supple     (  ) neck stiffness     (  ) nuchal rigidity     (  )  no JVD     (  ) JVD present ( -- cm)    HEART  Rate -->  (x  ) normal rate    (  ) bradycardic    (  ) tachycardic  Rhythm -->  ( x ) regular    (  ) regularly irregular    (  ) irregularly irregular  Murmurs -->  (  ) normal s1/s2    (  ) systolic murmur    (  ) diastolic murmur    (  ) continuous murmur     (  ) S3 present    (  ) S4 present    LUNGS  (x  )Unlabored respirations     (  ) tachypnea  (  ) B/L air entry     (  ) decreased breath sounds in:  (location     )    (  ) no adventitious sound     (  ) crackles     (  ) wheezing      (  ) rhonchi      (specify location:       )  (  ) chest wall tenderness (specify location:       )    ABDOMEN  (x  ) Soft     (  ) tense   |   (  ) nondistended     (  ) distended   |   (  ) +BS     (  ) hypoactive bowel sounds     (  ) hyperactive bowel sounds  (  ) nontender     (  ) RUQ tenderness     (  ) RLQ tenderness     (  ) LLQ tenderness     (  ) epigastric tenderness     (  ) diffuse tenderness  (  ) Splenomegaly      (  ) Hepatomegaly      (  ) Jaundice     (  ) ecchymosis     EXTREMITIES  (x  ) Normal     (  ) Rash     (  ) ecchymosis     (  ) varicose veins      (  ) pitting edema     (  ) non-pitting edema   (  ) ulceration     (  ) gangrene:     (location:     )    NERVOUS SYSTEM  (  x) A&Ox3     (  ) confused     (  ) lethargic  CN II-XII:     (  ) Intact     (  ) focal deficits  (Specify:     )   Upper extremities:     (  ) strength X/5     (  ) focal deficit (specify:    )  Lower extremities:     (  ) strength  X/5    (  ) focal deficit (specify:    )    SKIN  ( x ) No rashes or lesions     (  ) maculopapular rash     (  ) pustules     (  ) vesicles     (  ) ulcer     (  ) ecchymosis     (specify location:     )    (  ) Indwelling Winston Catheter   Date insterted:    Reason (  ) Critical illness     (  ) urinary retention    (  ) Accurate Ins/Outs Monitoring     (  ) CMO patient    (  ) Central Line  Date inserted:  Location: (  ) Right IJ   (  ) Left IJ   (  ) Right Fem   (  ) Left Fem    (  ) SPC  (  ) pigtail  (  ) PEG tube  (  ) colostomy  (  ) jejunostomy  (  ) U-Dall    LABS             14.0   8.51  )-----------( 241      ( 09-16-24 @ 22:05 )             43.1     147  |  106  |  33  -------------------------<  95   09-16-24 @ 22:05  4.3  |  31  |  1.2    Ca      9.6     09-16-24 @ 22:05    TPro  6.9  /  Alb  4.7  /  TBili  <0.2  /  DBili  x   /  AST  23  /  ALT  22  /  AlkPhos  52  /  GGT  x     09-16-24 @ 22:05      Troponin T, High Sensitivity Result: 14 ng/L (09-17-24 @ 00:52)  Troponin T, High Sensitivity Result: 12 ng/L (09-16-24 @ 22:05)    Urinalysis Basic - ( 16 Sep 2024 22:05 )    Color: x / Appearance: x / SG: x / pH: x  Gluc: 95 mg/dL / Ketone: x  / Bili: x / Urobili: x   Blood: x / Protein: x / Nitrite: x   Leuk Esterase: x / RBC: x / WBC x   Sq Epi: x / Non Sq Epi: x / Bacteria: x          IMAGING

## 2024-09-19 NOTE — DISCHARGE NOTE PROVIDER - HOSPITAL COURSE
Patient is a 64-year old Male with PMH of CAD h/o NSTEMI with PCI 2018 to Ramus, PCI 2024 to prox LAD, HTN, HLD, COPD, SOBEIDA (no longer needs CPAP), + family h/o CAD (father, sibling) who presented to the ED as per his Cardiologist (Dr. Dukes) for chest pain. The pain started 3 days ago. Patient was working around the house when he suddenly developed deep substernal chest pain. It started right after he began working with a 5-gallon bucket of paint. The pain was not relieved with rest, worse with lying on left-side. Patient endorses the sensation felt similar to what he had in 2018 prior to being diagnosed with NSTEMI. Patient denies HA, sob, chills, n/v/c/d, hematuria, dysuria, abd pain, recent sick contacts.    In ED, vitals were bp 180/113, HR 79, RR 17, temp 98.4, satting 95% on room air  Labs- trops neg x2  CXR- no acute cardiopulmonary disease  EKG- no acute ischemic changes    Patient received a dose of ASA 81mg, Plavix 75mg and was sent for nuclear stress test.    Nuclear Stress Test (Lexiscan):  1. MODERATE SIZE REVERSIBLE DEFECT IN INFERIOR WALL OF THE LEFT VENTRICLE   CONSISTENT WITH ISCHEMIA.  2. NORMAL RESTING LEFT VENTRICULAR WALL MOTION AND WALL THICKENING.  3. LEFT VENTRICULAR EJECTION FRACTION OF  60 % WHICH IS WITHIN RANGE OF   NORMAL.    Patient was seen by Cardiology, underwent C    [facility] FINDINGS:   LM: Mild disease  LAD: Patent stent in proximal segment, mild disease diffusely  D1: Mild disease  CX: Mild disease  Ramus: Patent stent, mild disease  RCA: Mild disease diffusely  RPDA: Mild disease    Pt has no acute complaints. Stable for dc.

## 2024-09-19 NOTE — DISCHARGE NOTE PROVIDER - NSDCMRMEDTOKEN_GEN_ALL_CORE_FT
albuterol 2.5 mg/3 mL (0.083%) inhalation solution: 1 packet(s) by nebulizer every 6 hours as needed for as needed  allopurinol 300 mg oral tablet: 1 tab(s) orally once a day  aspirin 81 mg oral tablet, chewable: 1 tab(s) orally once a day  benazepril 10 mg oral tablet: 1 tab(s) orally once a day  clopidogrel 75 mg oral tablet: 1 tab(s) orally once a day  doxazosin 4 mg oral tablet: 1 tab(s) orally once a day  ezetimibe 10 mg oral tablet: 1 tab(s) orally once a day  famotidine 20 mg oral tablet: 1 tab(s) orally once a day  fenofibrate 160 mg oral tablet: 1 tab(s) orally once a day  hydroCHLOROthiazide 25 mg oral tablet: 1 tab(s) orally once a day  isosorbide mononitrate 30 mg oral tablet, extended release: 1 tab(s) orally once a day (in the morning)  Metoprolol Succinate ER 25 mg oral tablet, extended release: 1 tab(s) orally once a day  pravastatin 10 mg oral tablet: 1 tab(s) orally once a day

## 2024-09-19 NOTE — DISCHARGE NOTE PROVIDER - NSDCFUSCHEDAPPT_GEN_ALL_CORE_FT
Mino Pablo  [facility]  [facility] [address]  Scheduled Appointment: 10/08/2024

## 2024-09-30 DIAGNOSIS — I10 ESSENTIAL (PRIMARY) HYPERTENSION: ICD-10-CM

## 2024-09-30 DIAGNOSIS — J44.9 CHRONIC OBSTRUCTIVE PULMONARY DISEASE, UNSPECIFIED: ICD-10-CM

## 2024-09-30 DIAGNOSIS — K21.9 GASTRO-ESOPHAGEAL REFLUX DISEASE WITHOUT ESOPHAGITIS: ICD-10-CM

## 2024-09-30 DIAGNOSIS — E78.5 HYPERLIPIDEMIA, UNSPECIFIED: ICD-10-CM

## 2024-09-30 DIAGNOSIS — G47.33 OBSTRUCTIVE SLEEP APNEA (ADULT) (PEDIATRIC): ICD-10-CM

## 2024-09-30 DIAGNOSIS — I25.2 OLD MYOCARDIAL INFARCTION: ICD-10-CM

## 2024-09-30 DIAGNOSIS — I25.110 ATHEROSCLEROTIC HEART DISEASE OF NATIVE CORONARY ARTERY WITH UNSTABLE ANGINA PECTORIS: ICD-10-CM

## 2024-09-30 DIAGNOSIS — Z95.5 PRESENCE OF CORONARY ANGIOPLASTY IMPLANT AND GRAFT: ICD-10-CM

## 2024-09-30 DIAGNOSIS — R07.9 CHEST PAIN, UNSPECIFIED: ICD-10-CM

## 2024-09-30 DIAGNOSIS — M10.9 GOUT, UNSPECIFIED: ICD-10-CM

## 2024-10-08 ENCOUNTER — APPOINTMENT (OUTPATIENT)
Dept: PULMONOLOGY | Facility: CLINIC | Age: 64
End: 2024-10-08
